# Patient Record
Sex: FEMALE | Race: WHITE | NOT HISPANIC OR LATINO
[De-identification: names, ages, dates, MRNs, and addresses within clinical notes are randomized per-mention and may not be internally consistent; named-entity substitution may affect disease eponyms.]

---

## 2022-07-12 ENCOUNTER — TRANSCRIPTION ENCOUNTER (OUTPATIENT)
Age: 87
End: 2022-07-12

## 2022-07-12 ENCOUNTER — INPATIENT (INPATIENT)
Facility: HOSPITAL | Age: 87
LOS: 5 days | Discharge: HOME | DRG: 660 | End: 2022-07-18
Attending: FAMILY MEDICINE | Admitting: UROLOGY
Payer: MEDICARE

## 2022-07-12 VITALS
HEART RATE: 82 BPM | DIASTOLIC BLOOD PRESSURE: 84 MMHG | HEIGHT: 60 IN | OXYGEN SATURATION: 99 % | SYSTOLIC BLOOD PRESSURE: 207 MMHG | WEIGHT: 136.03 LBS | TEMPERATURE: 98 F | RESPIRATION RATE: 18 BRPM

## 2022-07-12 LAB
ALBUMIN SERPL ELPH-MCNC: 4.3 G/DL — SIGNIFICANT CHANGE UP (ref 3.3–5.2)
ALP SERPL-CCNC: 80 U/L — SIGNIFICANT CHANGE UP (ref 40–120)
ALT FLD-CCNC: 13 U/L — SIGNIFICANT CHANGE UP
ANION GAP SERPL CALC-SCNC: 13 MMOL/L — SIGNIFICANT CHANGE UP (ref 5–17)
APPEARANCE UR: ABNORMAL
AST SERPL-CCNC: 18 U/L — SIGNIFICANT CHANGE UP
BASOPHILS # BLD AUTO: 0.01 K/UL — SIGNIFICANT CHANGE UP (ref 0–0.2)
BASOPHILS NFR BLD AUTO: 0.1 % — SIGNIFICANT CHANGE UP (ref 0–2)
BILIRUB SERPL-MCNC: 0.8 MG/DL — SIGNIFICANT CHANGE UP (ref 0.4–2)
BILIRUB UR-MCNC: ABNORMAL
BUN SERPL-MCNC: 19.3 MG/DL — SIGNIFICANT CHANGE UP (ref 8–20)
CALCIUM SERPL-MCNC: 9 MG/DL — SIGNIFICANT CHANGE UP (ref 8.6–10.2)
CHLORIDE SERPL-SCNC: 97 MMOL/L — LOW (ref 98–107)
CO2 SERPL-SCNC: 25 MMOL/L — SIGNIFICANT CHANGE UP (ref 22–29)
COLOR SPEC: YELLOW — SIGNIFICANT CHANGE UP
CREAT SERPL-MCNC: 0.82 MG/DL — SIGNIFICANT CHANGE UP (ref 0.5–1.3)
DIFF PNL FLD: ABNORMAL
EGFR: 68 ML/MIN/1.73M2 — SIGNIFICANT CHANGE UP
EOSINOPHIL # BLD AUTO: 0.01 K/UL — SIGNIFICANT CHANGE UP (ref 0–0.5)
EOSINOPHIL NFR BLD AUTO: 0.1 % — SIGNIFICANT CHANGE UP (ref 0–6)
EPI CELLS # UR: SIGNIFICANT CHANGE UP
GLUCOSE SERPL-MCNC: 116 MG/DL — HIGH (ref 70–99)
GLUCOSE UR QL: NEGATIVE MG/DL — SIGNIFICANT CHANGE UP
HCT VFR BLD CALC: 40.7 % — SIGNIFICANT CHANGE UP (ref 34.5–45)
HGB BLD-MCNC: 13.9 G/DL — SIGNIFICANT CHANGE UP (ref 11.5–15.5)
IMM GRANULOCYTES NFR BLD AUTO: 0.4 % — SIGNIFICANT CHANGE UP (ref 0–1.5)
KETONES UR-MCNC: ABNORMAL
LEUKOCYTE ESTERASE UR-ACNC: ABNORMAL
LYMPHOCYTES # BLD AUTO: 0.86 K/UL — LOW (ref 1–3.3)
LYMPHOCYTES # BLD AUTO: 9.4 % — LOW (ref 13–44)
MCHC RBC-ENTMCNC: 31.9 PG — SIGNIFICANT CHANGE UP (ref 27–34)
MCHC RBC-ENTMCNC: 34.2 GM/DL — SIGNIFICANT CHANGE UP (ref 32–36)
MCV RBC AUTO: 93.3 FL — SIGNIFICANT CHANGE UP (ref 80–100)
MONOCYTES # BLD AUTO: 0.47 K/UL — SIGNIFICANT CHANGE UP (ref 0–0.9)
MONOCYTES NFR BLD AUTO: 5.1 % — SIGNIFICANT CHANGE UP (ref 2–14)
NEUTROPHILS # BLD AUTO: 7.75 K/UL — HIGH (ref 1.8–7.4)
NEUTROPHILS NFR BLD AUTO: 84.9 % — HIGH (ref 43–77)
NITRITE UR-MCNC: NEGATIVE — SIGNIFICANT CHANGE UP
PH UR: 5 — SIGNIFICANT CHANGE UP (ref 5–8)
PLATELET # BLD AUTO: 232 K/UL — SIGNIFICANT CHANGE UP (ref 150–400)
POTASSIUM SERPL-MCNC: 4 MMOL/L — SIGNIFICANT CHANGE UP (ref 3.5–5.3)
POTASSIUM SERPL-SCNC: 4 MMOL/L — SIGNIFICANT CHANGE UP (ref 3.5–5.3)
PROT SERPL-MCNC: 6.8 G/DL — SIGNIFICANT CHANGE UP (ref 6.6–8.7)
PROT UR-MCNC: 30 MG/DL
RBC # BLD: 4.36 M/UL — SIGNIFICANT CHANGE UP (ref 3.8–5.2)
RBC # FLD: 12.2 % — SIGNIFICANT CHANGE UP (ref 10.3–14.5)
RBC CASTS # UR COMP ASSIST: ABNORMAL /HPF (ref 0–4)
SODIUM SERPL-SCNC: 135 MMOL/L — SIGNIFICANT CHANGE UP (ref 135–145)
SP GR SPEC: 1.02 — SIGNIFICANT CHANGE UP (ref 1.01–1.02)
UROBILINOGEN FLD QL: 4 MG/DL
WBC # BLD: 9.14 K/UL — SIGNIFICANT CHANGE UP (ref 3.8–10.5)
WBC # FLD AUTO: 9.14 K/UL — SIGNIFICANT CHANGE UP (ref 3.8–10.5)
WBC UR QL: SIGNIFICANT CHANGE UP /HPF (ref 0–5)

## 2022-07-12 PROCEDURE — 99285 EMERGENCY DEPT VISIT HI MDM: CPT

## 2022-07-12 RX ORDER — LIDOCAINE 4 G/100G
1 CREAM TOPICAL ONCE
Refills: 0 | Status: COMPLETED | OUTPATIENT
Start: 2022-07-12 | End: 2022-07-12

## 2022-07-12 RX ORDER — CEFTRIAXONE 500 MG/1
1000 INJECTION, POWDER, FOR SOLUTION INTRAMUSCULAR; INTRAVENOUS ONCE
Refills: 0 | Status: COMPLETED | OUTPATIENT
Start: 2022-07-12 | End: 2022-07-12

## 2022-07-12 RX ORDER — METHOCARBAMOL 500 MG/1
1500 TABLET, FILM COATED ORAL ONCE
Refills: 0 | Status: COMPLETED | OUTPATIENT
Start: 2022-07-12 | End: 2022-07-12

## 2022-07-12 RX ORDER — KETOROLAC TROMETHAMINE 30 MG/ML
15 SYRINGE (ML) INJECTION ONCE
Refills: 0 | Status: DISCONTINUED | OUTPATIENT
Start: 2022-07-12 | End: 2022-07-12

## 2022-07-12 RX ADMIN — LIDOCAINE 1 PATCH: 4 CREAM TOPICAL at 21:27

## 2022-07-12 RX ADMIN — CEFTRIAXONE 100 MILLIGRAM(S): 500 INJECTION, POWDER, FOR SOLUTION INTRAMUSCULAR; INTRAVENOUS at 23:03

## 2022-07-12 RX ADMIN — METHOCARBAMOL 1500 MILLIGRAM(S): 500 TABLET, FILM COATED ORAL at 21:26

## 2022-07-12 RX ADMIN — Medication 15 MILLIGRAM(S): at 21:27

## 2022-07-12 NOTE — ED PROVIDER NOTE - ATTENDING CONTRIBUTION TO CARE
I personally saw the patient with the resident, and completed the key components of the history and physical exam. I then discussed the management plan with the resident.    88 y/o with PMH chronic compression fractures, HTN presents for back pain, urinary frequency, urgency, minimal output and an episode of vomiting x 3 days that feels different from her usual back pain. Denies bladder/bowel dysfunction, numbness, weakness.    PE - NAD, well appearing, abd soft, + suprapubic TTP, no midline step offs or point tenderness of the thoracic and lumbar spine, no CVAT, full ROM bilateral LE, sensation intact, ambulates steadily with assistance.    labs, UA, CT, pain control, reassess.

## 2022-07-12 NOTE — ED PROVIDER NOTE - CLINICAL SUMMARY MEDICAL DECISION MAKING FREE TEXT BOX
Patient is a 88 yo female with PMHx osteoporosis, chronic compression fractures, HTN presenting with back pain, urinary urgency, hesitancy, and n/v. As per patient and daughter at bedside patient developed 8/10 dull constant back pain 3 days ago without relief from Tylenol as well as urinary hesitancy, dribbling, and 1 episode of nonbilious nonbloody vomiting 2 days ago. BP 200s/80s, vitals otherwise stable, uncomfortable appearing, lungs ctab, belly soft nontender, no cva ttp, midline T&L spine ttp no stepoffs or obvious deformities, full ROM at the hips knees ankles, no FND, no ataxia, PERRLA, EOMI. Will obtain CT A/P and T&L spine to r/o pyelo vs. spinal fx, obtain labs UA Ucx to r/o UTI, and control back pain with toradol lidocaine robaxin.

## 2022-07-12 NOTE — ED PROVIDER NOTE - PHYSICAL EXAMINATION
Constitutional: Uncomfortable appearing, awake, alert, oriented to person, place, and time/situation and in no apparent distress  ENMT: Airway patent nasal mucosa clear. Mouth with normal mucosa. Throat has no vesicles no oropharyngeal exudates and uvula is midline. No blood in the oropharynx  EYES: clear bilaterally, pupils equal, round and reactive to light  Cardiac: Regular rate, regular rhythm. Heart sounds S1, S2. No murmurs, rubs or gallops. Good capillary refill, 2+ pulses, no peripheral edema  Respiratory: Lungs CTAB, no use of accessory muscles, no crackles, satting 99% on RA in no distress  Gastrointestinal: Abdomen nondistended, non-tender, no rebound guarding or peritoneal signs  Genitourinary: No CVA tenderness, mild suprapubic ttp, bladder nondistended  Musculoskeletal: Spine appears normal, range of motion is not limited, no cervical ttp, midline Thoracic and upper lumbar ttp no stepoffs or obvious deformities, negative straight leg test, full ROM at the hips knees ankles, sensation and reflexes intact, 2+ pulses, capillary refill < 2 seconds  Neurological: Alert and oriented, no focal deficits, no motor or sensory deficits. CN 2-12 intact, PERRLA, EOMI, No FND, moving all extremities equally, sensation intact, No dysmetria, no ataxia, negative heel-shin, 5/5 strength   Skin: Skin normal color for race, warm, dry and intact, No evidence of rash

## 2022-07-12 NOTE — ED ADULT TRIAGE NOTE - DIRECT TO ROOM CARE INITIATED:
Denies known Latex allergy or symptoms of Latex sensitivity.  Medications verified, no changes.  Pt here for right shoulder MRI results. Still having shoulder discomfort.   12-Jul-2022 20:10

## 2022-07-12 NOTE — ED PROVIDER NOTE - ATTENDING WITH...
Comment: Pt found no improvement with TAC 0.1% and had adverse effects of steroids including thinning skin/light spots; opts only for non steroid topicals Detail Level: Simple Render Risk Assessment In Note?: no Resident

## 2022-07-12 NOTE — ED ADULT TRIAGE NOTE - CHIEF COMPLAINT QUOTE
pt c/o nontraumatic lumbar pain x3 days. pt tried taking Tylenol and Motrin @ home w/ no relief. pt has HTN, did not take meds today.

## 2022-07-12 NOTE — ED PROVIDER NOTE - OBJECTIVE STATEMENT
Patient is a 88 yo female with PMHx osteoporosis, chronic compression fractures, HTN presenting with back pain, urinary urgency, hesitancy, and n/v. As per patient and daughter at bedside patient developed 8/10 dull constant back pain 3 days ago without relief from Tylenol as well as urinary hesitancy, dribbling, and 1 episode of nonbilious nonbloody vomiting 2 days ago. Denies falls, headstrike, LOC, blood thinners, hematuria, fevers, CP, SOB, abdominal pain, IVDU, saddle anesthesia, /GI procedures, difficulty urinating, urinary or bowel incontinence. Patient received prednisone from urgent care with mild relief; she did not take her home Lisinopril 30 mg today. Denies fevers, chills, dizziness, lightheadedness, dysphagia, dysarthria, diplopia, photophobia, syncope, cough, congestion, SOB, CP, abdominal pain, neck pain, diarrhea, dysuria, hematuria, hematochezia, hematemesis,  recent travel, sick contacts, leg swelling.

## 2022-07-12 NOTE — ED PROVIDER NOTE - SHIFT CHANGE DETAILS
Patient signed out pending labs, CT and reassessment, all further work up and management at the discretion of receiving physician.

## 2022-07-13 DIAGNOSIS — N20.0 CALCULUS OF KIDNEY: ICD-10-CM

## 2022-07-13 LAB
ABO RH CONFIRMATION: SIGNIFICANT CHANGE UP
APTT BLD: 25.4 SEC — LOW (ref 27.5–35.5)
BASOPHILS # BLD AUTO: 0.01 K/UL — SIGNIFICANT CHANGE UP (ref 0–0.2)
BASOPHILS NFR BLD AUTO: 0.1 % — SIGNIFICANT CHANGE UP (ref 0–2)
BLD GP AB SCN SERPL QL: SIGNIFICANT CHANGE UP
EOSINOPHIL # BLD AUTO: 0 K/UL — SIGNIFICANT CHANGE UP (ref 0–0.5)
EOSINOPHIL NFR BLD AUTO: 0 % — SIGNIFICANT CHANGE UP (ref 0–6)
HCT VFR BLD CALC: 39.8 % — SIGNIFICANT CHANGE UP (ref 34.5–45)
HGB BLD-MCNC: 13.7 G/DL — SIGNIFICANT CHANGE UP (ref 11.5–15.5)
IMM GRANULOCYTES NFR BLD AUTO: 0.5 % — SIGNIFICANT CHANGE UP (ref 0–1.5)
INR BLD: 1.01 RATIO — SIGNIFICANT CHANGE UP (ref 0.88–1.16)
LYMPHOCYTES # BLD AUTO: 1.28 K/UL — SIGNIFICANT CHANGE UP (ref 1–3.3)
LYMPHOCYTES # BLD AUTO: 15.3 % — SIGNIFICANT CHANGE UP (ref 13–44)
MAGNESIUM SERPL-MCNC: 1.6 MG/DL — SIGNIFICANT CHANGE UP (ref 1.6–2.6)
MCHC RBC-ENTMCNC: 31.9 PG — SIGNIFICANT CHANGE UP (ref 27–34)
MCHC RBC-ENTMCNC: 34.4 GM/DL — SIGNIFICANT CHANGE UP (ref 32–36)
MCV RBC AUTO: 92.8 FL — SIGNIFICANT CHANGE UP (ref 80–100)
MONOCYTES # BLD AUTO: 0.56 K/UL — SIGNIFICANT CHANGE UP (ref 0–0.9)
MONOCYTES NFR BLD AUTO: 6.7 % — SIGNIFICANT CHANGE UP (ref 2–14)
NEUTROPHILS # BLD AUTO: 6.48 K/UL — SIGNIFICANT CHANGE UP (ref 1.8–7.4)
NEUTROPHILS NFR BLD AUTO: 77.4 % — HIGH (ref 43–77)
PHOSPHATE SERPL-MCNC: 3.6 MG/DL — SIGNIFICANT CHANGE UP (ref 2.4–4.7)
PLATELET # BLD AUTO: 220 K/UL — SIGNIFICANT CHANGE UP (ref 150–400)
PROTHROM AB SERPL-ACNC: 11.7 SEC — SIGNIFICANT CHANGE UP (ref 10.5–13.4)
RBC # BLD: 4.29 M/UL — SIGNIFICANT CHANGE UP (ref 3.8–5.2)
RBC # FLD: 12.2 % — SIGNIFICANT CHANGE UP (ref 10.3–14.5)
SARS-COV-2 RNA SPEC QL NAA+PROBE: SIGNIFICANT CHANGE UP
WBC # BLD: 8.37 K/UL — SIGNIFICANT CHANGE UP (ref 3.8–10.5)
WBC # FLD AUTO: 8.37 K/UL — SIGNIFICANT CHANGE UP (ref 3.8–10.5)

## 2022-07-13 PROCEDURE — 99222 1ST HOSP IP/OBS MODERATE 55: CPT | Mod: 25

## 2022-07-13 PROCEDURE — 72128 CT CHEST SPINE W/O DYE: CPT | Mod: 26,MA

## 2022-07-13 PROCEDURE — 72131 CT LUMBAR SPINE W/O DYE: CPT | Mod: 26,MA

## 2022-07-13 PROCEDURE — 52332 CYSTOSCOPY AND TREATMENT: CPT | Mod: LT

## 2022-07-13 PROCEDURE — 74177 CT ABD & PELVIS W/CONTRAST: CPT | Mod: 26,MA

## 2022-07-13 PROCEDURE — 99232 SBSQ HOSP IP/OBS MODERATE 35: CPT

## 2022-07-13 DEVICE — STENT URET DBL J 6X24 W/O GDWIRE: Type: IMPLANTABLE DEVICE | Status: FUNCTIONAL

## 2022-07-13 DEVICE — CATH URET AXCESS 6FR 70CM: Type: IMPLANTABLE DEVICE | Status: FUNCTIONAL

## 2022-07-13 DEVICE — CATH URET STONE DISPLC DL 10FR: Type: IMPLANTABLE DEVICE | Status: FUNCTIONAL

## 2022-07-13 DEVICE — GWIRE NITINOL STRT TIP .035IN 150CM: Type: IMPLANTABLE DEVICE | Status: FUNCTIONAL

## 2022-07-13 RX ORDER — HYDRALAZINE HCL 50 MG
25 TABLET ORAL EVERY 6 HOURS
Refills: 0 | Status: DISCONTINUED | OUTPATIENT
Start: 2022-07-13 | End: 2022-07-18

## 2022-07-13 RX ORDER — TAMSULOSIN HYDROCHLORIDE 0.4 MG/1
0.4 CAPSULE ORAL AT BEDTIME
Refills: 0 | Status: DISCONTINUED | OUTPATIENT
Start: 2022-07-13 | End: 2022-07-13

## 2022-07-13 RX ORDER — AMLODIPINE BESYLATE 2.5 MG/1
5 TABLET ORAL DAILY
Refills: 0 | Status: DISCONTINUED | OUTPATIENT
Start: 2022-07-13 | End: 2022-07-18

## 2022-07-13 RX ORDER — ESCITALOPRAM OXALATE 10 MG/1
20 TABLET, FILM COATED ORAL DAILY
Refills: 0 | Status: DISCONTINUED | OUTPATIENT
Start: 2022-07-13 | End: 2022-07-13

## 2022-07-13 RX ORDER — SODIUM CHLORIDE 9 MG/ML
1000 INJECTION, SOLUTION INTRAVENOUS
Refills: 0 | Status: DISCONTINUED | OUTPATIENT
Start: 2022-07-13 | End: 2022-07-13

## 2022-07-13 RX ORDER — HYDROMORPHONE HYDROCHLORIDE 2 MG/ML
0.5 INJECTION INTRAMUSCULAR; INTRAVENOUS; SUBCUTANEOUS ONCE
Refills: 0 | Status: DISCONTINUED | OUTPATIENT
Start: 2022-07-13 | End: 2022-07-13

## 2022-07-13 RX ORDER — ONDANSETRON 8 MG/1
4 TABLET, FILM COATED ORAL ONCE
Refills: 0 | Status: DISCONTINUED | OUTPATIENT
Start: 2022-07-13 | End: 2022-07-13

## 2022-07-13 RX ORDER — LISINOPRIL 2.5 MG/1
20 TABLET ORAL DAILY
Refills: 0 | Status: DISCONTINUED | OUTPATIENT
Start: 2022-07-13 | End: 2022-07-13

## 2022-07-13 RX ORDER — CEPHALEXIN 500 MG
250 CAPSULE ORAL EVERY 6 HOURS
Refills: 0 | Status: DISCONTINUED | OUTPATIENT
Start: 2022-07-14 | End: 2022-07-18

## 2022-07-13 RX ORDER — ACETAMINOPHEN 500 MG
1000 TABLET ORAL ONCE
Refills: 0 | Status: COMPLETED | OUTPATIENT
Start: 2022-07-13 | End: 2022-07-13

## 2022-07-13 RX ORDER — KETOROLAC TROMETHAMINE 30 MG/ML
15 SYRINGE (ML) INJECTION EVERY 6 HOURS
Refills: 0 | Status: DISCONTINUED | OUTPATIENT
Start: 2022-07-13 | End: 2022-07-13

## 2022-07-13 RX ORDER — DEXTROSE MONOHYDRATE, SODIUM CHLORIDE, AND POTASSIUM CHLORIDE 50; .745; 4.5 G/1000ML; G/1000ML; G/1000ML
1000 INJECTION, SOLUTION INTRAVENOUS
Refills: 0 | Status: DISCONTINUED | OUTPATIENT
Start: 2022-07-13 | End: 2022-07-13

## 2022-07-13 RX ORDER — LISINOPRIL 2.5 MG/1
30 TABLET ORAL DAILY
Refills: 0 | Status: DISCONTINUED | OUTPATIENT
Start: 2022-07-13 | End: 2022-07-14

## 2022-07-13 RX ORDER — FENTANYL CITRATE 50 UG/ML
25 INJECTION INTRAVENOUS
Refills: 0 | Status: DISCONTINUED | OUTPATIENT
Start: 2022-07-13 | End: 2022-07-13

## 2022-07-13 RX ORDER — TRAMADOL HYDROCHLORIDE 50 MG/1
50 TABLET ORAL EVERY 6 HOURS
Refills: 0 | Status: DISCONTINUED | OUTPATIENT
Start: 2022-07-13 | End: 2022-07-18

## 2022-07-13 RX ORDER — TRAMADOL HYDROCHLORIDE 50 MG/1
25 TABLET ORAL EVERY 6 HOURS
Refills: 0 | Status: DISCONTINUED | OUTPATIENT
Start: 2022-07-13 | End: 2022-07-18

## 2022-07-13 RX ORDER — PANTOPRAZOLE SODIUM 20 MG/1
40 TABLET, DELAYED RELEASE ORAL
Refills: 0 | Status: DISCONTINUED | OUTPATIENT
Start: 2022-07-13 | End: 2022-07-13

## 2022-07-13 RX ORDER — KETOROLAC TROMETHAMINE 30 MG/ML
15 SYRINGE (ML) INJECTION ONCE
Refills: 0 | Status: DISCONTINUED | OUTPATIENT
Start: 2022-07-13 | End: 2022-07-13

## 2022-07-13 RX ORDER — HEPARIN SODIUM 5000 [USP'U]/ML
5000 INJECTION INTRAVENOUS; SUBCUTANEOUS EVERY 12 HOURS
Refills: 0 | Status: DISCONTINUED | OUTPATIENT
Start: 2022-07-13 | End: 2022-07-18

## 2022-07-13 RX ADMIN — HYDROMORPHONE HYDROCHLORIDE 0.5 MILLIGRAM(S): 2 INJECTION INTRAMUSCULAR; INTRAVENOUS; SUBCUTANEOUS at 17:17

## 2022-07-13 RX ADMIN — Medication 15 MILLIGRAM(S): at 20:37

## 2022-07-13 RX ADMIN — TAMSULOSIN HYDROCHLORIDE 0.4 MILLIGRAM(S): 0.4 CAPSULE ORAL at 04:42

## 2022-07-13 RX ADMIN — Medication 15 MILLIGRAM(S): at 20:43

## 2022-07-13 RX ADMIN — Medication 25 MILLIGRAM(S): at 20:42

## 2022-07-13 RX ADMIN — HEPARIN SODIUM 5000 UNIT(S): 5000 INJECTION INTRAVENOUS; SUBCUTANEOUS at 22:25

## 2022-07-13 RX ADMIN — Medication 400 MILLIGRAM(S): at 20:36

## 2022-07-13 RX ADMIN — HYDROMORPHONE HYDROCHLORIDE 0.5 MILLIGRAM(S): 2 INJECTION INTRAMUSCULAR; INTRAVENOUS; SUBCUTANEOUS at 08:59

## 2022-07-13 RX ADMIN — FENTANYL CITRATE 25 MICROGRAM(S): 50 INJECTION INTRAVENOUS at 19:49

## 2022-07-13 RX ADMIN — SODIUM CHLORIDE 75 MILLILITER(S): 9 INJECTION, SOLUTION INTRAVENOUS at 21:35

## 2022-07-13 RX ADMIN — SODIUM CHLORIDE 50 MILLILITER(S): 9 INJECTION, SOLUTION INTRAVENOUS at 16:52

## 2022-07-13 RX ADMIN — HYDROMORPHONE HYDROCHLORIDE 0.5 MILLIGRAM(S): 2 INJECTION INTRAMUSCULAR; INTRAVENOUS; SUBCUTANEOUS at 16:52

## 2022-07-13 RX ADMIN — HYDROMORPHONE HYDROCHLORIDE 0.5 MILLIGRAM(S): 2 INJECTION INTRAMUSCULAR; INTRAVENOUS; SUBCUTANEOUS at 09:03

## 2022-07-13 RX ADMIN — SODIUM CHLORIDE 50 MILLILITER(S): 9 INJECTION, SOLUTION INTRAVENOUS at 22:23

## 2022-07-13 RX ADMIN — HYDROMORPHONE HYDROCHLORIDE 0.5 MILLIGRAM(S): 2 INJECTION INTRAMUSCULAR; INTRAVENOUS; SUBCUTANEOUS at 12:06

## 2022-07-13 RX ADMIN — FENTANYL CITRATE 25 MICROGRAM(S): 50 INJECTION INTRAVENOUS at 20:08

## 2022-07-13 RX ADMIN — Medication 15 MILLIGRAM(S): at 22:40

## 2022-07-13 RX ADMIN — SODIUM CHLORIDE 50 MILLILITER(S): 9 INJECTION, SOLUTION INTRAVENOUS at 12:08

## 2022-07-13 RX ADMIN — ESCITALOPRAM OXALATE 20 MILLIGRAM(S): 10 TABLET, FILM COATED ORAL at 05:36

## 2022-07-13 RX ADMIN — Medication 15 MILLIGRAM(S): at 22:55

## 2022-07-13 RX ADMIN — LISINOPRIL 20 MILLIGRAM(S): 2.5 TABLET ORAL at 05:36

## 2022-07-13 RX ADMIN — Medication 400 MILLIGRAM(S): at 22:24

## 2022-07-13 RX ADMIN — Medication 1000 MILLIGRAM(S): at 20:43

## 2022-07-13 RX ADMIN — Medication 1000 MILLIGRAM(S): at 22:54

## 2022-07-13 RX ADMIN — SODIUM CHLORIDE 75 MILLILITER(S): 9 INJECTION, SOLUTION INTRAVENOUS at 19:53

## 2022-07-13 RX ADMIN — FENTANYL CITRATE 25 MICROGRAM(S): 50 INJECTION INTRAVENOUS at 19:53

## 2022-07-13 RX ADMIN — AMLODIPINE BESYLATE 5 MILLIGRAM(S): 2.5 TABLET ORAL at 20:41

## 2022-07-13 NOTE — PROGRESS NOTE ADULT - SUBJECTIVE AND OBJECTIVE BOX
CC: Left obstructing ureteral stone s/p cystoscopy and stent  HPI: 88 y/o female with hx of of HTN, chronic back pain from lumbar compression fx, admitted to Urology service on 7/12 for obstructing left sided proximal UPJ stone. Patient underwent cystoscopy and stent placement today. Post operatively patient with persistent HTN despite adequate pain control and Urology requesting transfer to Medicine service for BP management prior to being DCd home. No reported intra-op complications reported. Patient does admit to having elevated BP at baseline. Currently only on lisinopril 30mg daily. SBP ranging from 170-200. See in Pacu  CC: Left obstructing ureteral stone s/p cystoscopy and stent  HPI: 89 y/o female with hx of of HTN, chronic back pain from lumbar compression fx, admitted to Urology service on 7/12 for obstructing left sided proximal UPJ stone. Patient underwent cystoscopy and stent placement today. Post operatively patient with persistent HTN despite adequate pain control and Urology requesting transfer to Medicine service for BP management prior to being DCd home. No reported intra-op complications reported. Patient does admit to having elevated BP at baseline. Currently only on lisinopril 30mg daily. SBP ranging from 170-200. See in Pacu with complaints of typical back pain. Denies LE weakness, denies fever, chills, N/V, SOB, CP. Discussed plan with urology who wants patient to remain on Keflex post procedure. Daughter aware patient will be admitted for BP management.

## 2022-07-13 NOTE — PROGRESS NOTE ADULT - ASSESSMENT
88 y/o female with left obstructing renal stone s/p cystoscopy and stent placement, Uncontrolled HTN, Chronic back pain from lumbar compression fractures     Renal stone s/p stent:  -Cont. care per Urology  -Cont. Abx  -Pain control  -OOB, ambulate PT program  -DVT-P with sub q heparin  -Arrange for o/p Urology f/u on DC  -Fall risk, ambulate with assistance    Uncontrolled HTN:  -Stage 2 HTN  -Add Norvasc, cont. Lisinopril   -DASH diet  -PRN Hydralazine    lumbar compression fx:  -CTs on admission reviewed   -Fall risk  -Tyelnol, toradol  -Add percocet prn   -PT program  -o/p f/u    Discussed with Urology NP, PACU Nurse, Patient

## 2022-07-13 NOTE — PROGRESS NOTE ADULT - COMMENTS
Vital Signs Last 24 Hrs  T(C): 36.4 (13 Jul 2022 22:00), Max: 36.9 (13 Jul 2022 16:38)  T(F): 97.6 (13 Jul 2022 22:00), Max: 98.5 (13 Jul 2022 16:38)  HR: 82 (13 Jul 2022 23:05) (62 - 100)  BP: 177/70 (13 Jul 2022 23:05) (133/71 - 207/93)  BP(mean): 101 (13 Jul 2022 21:16) (87 - 113)  RR: 18 (13 Jul 2022 23:05) (10 - 20)  SpO2: 98% (13 Jul 2022 23:05) (90% - 99%)    Parameters below as of 13 Jul 2022 23:05  Patient On (Oxygen Delivery Method): nasal cannula  O2 Flow (L/min): 2

## 2022-07-13 NOTE — PATIENT PROFILE ADULT - FALL HARM RISK - HARM RISK INTERVENTIONS

## 2022-07-13 NOTE — H&P ADULT - NSHPADDITIONALINFOADULT_GEN_ALL_CORE
Patient has risk of developing sepsis and the stone is not likely to pass given its size.  no fevers or chills.    Feel that stent placement would make ureteroscopy easier in addition to alleviating her discomfort.     Added on to OR schedule.

## 2022-07-13 NOTE — ED ADULT NURSE REASSESSMENT NOTE - NS ED NURSE REASSESS COMMENT FT1
pt received in shift change report at this time, resting in cart comfortable. pt is in no distress, c.o intermittent dull pain. aware of NPO status and made aware of plan for OR today. pt with no complaints, BP elevated but hx HTN and becoming more stable meds given by previous shift. safety maintained, will continue to monitor.

## 2022-07-13 NOTE — BRIEF OPERATIVE NOTE - NSICDXBRIEFPOSTOP_GEN_ALL_CORE_FT
POST-OP DIAGNOSIS:  Renal colic 13-Jul-2022 19:10:37  Fran Alanis  Ureteral stone 13-Jul-2022 19:10:42  Fran Alanis

## 2022-07-13 NOTE — H&P ADULT - HISTORY OF PRESENT ILLNESS
Patient is a 88 y/o F with chronic compression fractures to thoracic and lumbar spine presenting with 3 days of left flank pain with no associated symptoms. Patient states " this has been bothering me for awhile but the last 3 days it got worse" Patient found to have +UTI Patient is a 89 y/o F with chronic compression fractures to thoracic and lumbar spine presenting with 3 days of left flank pain with no associated symptoms. Patient states " this has been bothering me for awhile but the last 3 days it got worse" nothing makes the pain better or worse.  Patient denies N/V, anorexia, malaise. Patient found to have +UTI, and ct of the ab/pelvis shows left mild hydronephrosis with 9/13mm stone in the proximal ureter. Patient states that she has never had kidney stones in the past.

## 2022-07-13 NOTE — CHART NOTE - NSCHARTNOTEFT_GEN_A_CORE
Patient post-op from cysto with left stent with Dr. Fran Alanis has been hypertensive, to note does have a history of HTN and was hypertensive post-op. I spoke with hospitalist Ken Alanis who has agreed to transfer the patient to medicine for blood pressure management.

## 2022-07-13 NOTE — ED ADULT NURSE REASSESSMENT NOTE - NS ED NURSE REASSESS COMMENT FT1
Pt alert and oriented X4, pt c/o pain but fall asleep, safety in place, will endorse to oncoming shift.

## 2022-07-13 NOTE — BRIEF OPERATIVE NOTE - NSICDXBRIEFPREOP_GEN_ALL_CORE_FT
PRE-OP DIAGNOSIS:  Renal colic 13-Jul-2022 19:09:55  Fran Alanis  Ureteral stone 13-Jul-2022 19:10:30  Fran Alanis

## 2022-07-13 NOTE — H&P ADULT - NSHPPHYSICALEXAM_GEN_ALL_CORE
Constitutional: Well-developed well nourished Female in no acute distress, appears younger than stated age  Respiratory: Breath sounds CTA b/l respirations are unlabored, no accessory muscle use, no conversational dyspnea  Cardiovascular: Regular rate & rhythm, +S1, S1,  Gastrointestinal: Abdomen soft, non-tender, non-distended, no rebound tenderness / guarding,   : voiding spontaneously, mild ttp to left flank  Neurological: GCS: 15 (4/5/6). A&O x 3; no gross sensory / motor / coordination deficits

## 2022-07-13 NOTE — ASU PREOP CHECKLIST - BMI (KG/M2)
Pt walked up from . Dual skin assessment completed with RN, no findings aside from surgical wounds. See epic for details. Chest tube intact, dressing CDI, crepitus noted around area, flow 1900 (consistent with report from CV RN), draininage sanguinous. VSS. Pain medications administered.     Bed alarm on, call light within reach.    26.4

## 2022-07-13 NOTE — H&P ADULT - ASSESSMENT
Patient is a 89F with pmh HTN, HL, depression presenting with 3 day history of left flank pain with no associated symptoms, and + UTI  Plan:  admit to [facility]   NPO except for meds  IV hydration   f/u am labs  f/u urine culture  pain control   continue home meds  To OR today with cysto with left stent

## 2022-07-14 DIAGNOSIS — N20.0 CALCULUS OF KIDNEY: ICD-10-CM

## 2022-07-14 DIAGNOSIS — S32.000A WEDGE COMPRESSION FRACTURE OF UNSPECIFIED LUMBAR VERTEBRA, INITIAL ENCOUNTER FOR CLOSED FRACTURE: ICD-10-CM

## 2022-07-14 DIAGNOSIS — Z02.9 ENCOUNTER FOR ADMINISTRATIVE EXAMINATIONS, UNSPECIFIED: ICD-10-CM

## 2022-07-14 DIAGNOSIS — K59.00 CONSTIPATION, UNSPECIFIED: ICD-10-CM

## 2022-07-14 DIAGNOSIS — I10 ESSENTIAL (PRIMARY) HYPERTENSION: ICD-10-CM

## 2022-07-14 DIAGNOSIS — Z29.9 ENCOUNTER FOR PROPHYLACTIC MEASURES, UNSPECIFIED: ICD-10-CM

## 2022-07-14 LAB
ANION GAP SERPL CALC-SCNC: 13 MMOL/L — SIGNIFICANT CHANGE UP (ref 5–17)
BUN SERPL-MCNC: 14.3 MG/DL — SIGNIFICANT CHANGE UP (ref 8–20)
CALCIUM SERPL-MCNC: 8.6 MG/DL — SIGNIFICANT CHANGE UP (ref 8.6–10.2)
CHLORIDE SERPL-SCNC: 95 MMOL/L — LOW (ref 98–107)
CO2 SERPL-SCNC: 27 MMOL/L — SIGNIFICANT CHANGE UP (ref 22–29)
CREAT SERPL-MCNC: 0.52 MG/DL — SIGNIFICANT CHANGE UP (ref 0.5–1.3)
CULTURE RESULTS: SIGNIFICANT CHANGE UP
EGFR: 89 ML/MIN/1.73M2 — SIGNIFICANT CHANGE UP
GLUCOSE SERPL-MCNC: 103 MG/DL — HIGH (ref 70–99)
HCT VFR BLD CALC: 37.5 % — SIGNIFICANT CHANGE UP (ref 34.5–45)
HGB BLD-MCNC: 13 G/DL — SIGNIFICANT CHANGE UP (ref 11.5–15.5)
MAGNESIUM SERPL-MCNC: 1.5 MG/DL — LOW (ref 1.8–2.6)
MCHC RBC-ENTMCNC: 32.6 PG — SIGNIFICANT CHANGE UP (ref 27–34)
MCHC RBC-ENTMCNC: 34.7 GM/DL — SIGNIFICANT CHANGE UP (ref 32–36)
MCV RBC AUTO: 94 FL — SIGNIFICANT CHANGE UP (ref 80–100)
PHOSPHATE SERPL-MCNC: 2.8 MG/DL — SIGNIFICANT CHANGE UP (ref 2.4–4.7)
PLATELET # BLD AUTO: 218 K/UL — SIGNIFICANT CHANGE UP (ref 150–400)
POTASSIUM SERPL-MCNC: 4.6 MMOL/L — SIGNIFICANT CHANGE UP (ref 3.5–5.3)
POTASSIUM SERPL-SCNC: 4.6 MMOL/L — SIGNIFICANT CHANGE UP (ref 3.5–5.3)
RBC # BLD: 3.99 M/UL — SIGNIFICANT CHANGE UP (ref 3.8–5.2)
RBC # FLD: 12.2 % — SIGNIFICANT CHANGE UP (ref 10.3–14.5)
SODIUM SERPL-SCNC: 134 MMOL/L — LOW (ref 135–145)
SPECIMEN SOURCE: SIGNIFICANT CHANGE UP
WBC # BLD: 7.69 K/UL — SIGNIFICANT CHANGE UP (ref 3.8–10.5)
WBC # FLD AUTO: 7.69 K/UL — SIGNIFICANT CHANGE UP (ref 3.8–10.5)

## 2022-07-14 PROCEDURE — 99231 SBSQ HOSP IP/OBS SF/LOW 25: CPT

## 2022-07-14 PROCEDURE — 99232 SBSQ HOSP IP/OBS MODERATE 35: CPT

## 2022-07-14 RX ORDER — IBUPROFEN 200 MG
600 TABLET ORAL EVERY 6 HOURS
Refills: 0 | Status: DISCONTINUED | OUTPATIENT
Start: 2022-07-14 | End: 2022-07-15

## 2022-07-14 RX ORDER — MAGNESIUM SULFATE 500 MG/ML
2 VIAL (ML) INJECTION ONCE
Refills: 0 | Status: COMPLETED | OUTPATIENT
Start: 2022-07-14 | End: 2022-07-14

## 2022-07-14 RX ORDER — KETOROLAC TROMETHAMINE 30 MG/ML
15 SYRINGE (ML) INJECTION ONCE
Refills: 0 | Status: DISCONTINUED | OUTPATIENT
Start: 2022-07-14 | End: 2022-07-14

## 2022-07-14 RX ORDER — ACETAMINOPHEN 500 MG
1000 TABLET ORAL ONCE
Refills: 0 | Status: COMPLETED | OUTPATIENT
Start: 2022-07-14 | End: 2022-07-14

## 2022-07-14 RX ORDER — SENNA PLUS 8.6 MG/1
2 TABLET ORAL AT BEDTIME
Refills: 0 | Status: DISCONTINUED | OUTPATIENT
Start: 2022-07-14 | End: 2022-07-18

## 2022-07-14 RX ORDER — LISINOPRIL 2.5 MG/1
10 TABLET ORAL ONCE
Refills: 0 | Status: COMPLETED | OUTPATIENT
Start: 2022-07-14 | End: 2022-07-14

## 2022-07-14 RX ORDER — POLYETHYLENE GLYCOL 3350 17 G/17G
17 POWDER, FOR SOLUTION ORAL DAILY
Refills: 0 | Status: COMPLETED | OUTPATIENT
Start: 2022-07-14 | End: 2022-07-17

## 2022-07-14 RX ORDER — LISINOPRIL 2.5 MG/1
40 TABLET ORAL DAILY
Refills: 0 | Status: DISCONTINUED | OUTPATIENT
Start: 2022-07-15 | End: 2022-07-18

## 2022-07-14 RX ORDER — SODIUM CHLORIDE 9 MG/ML
1000 INJECTION, SOLUTION INTRAVENOUS
Refills: 0 | Status: DISCONTINUED | OUTPATIENT
Start: 2022-07-14 | End: 2022-07-15

## 2022-07-14 RX ADMIN — TRAMADOL HYDROCHLORIDE 50 MILLIGRAM(S): 50 TABLET ORAL at 03:40

## 2022-07-14 RX ADMIN — Medication 600 MILLIGRAM(S): at 12:55

## 2022-07-14 RX ADMIN — LISINOPRIL 10 MILLIGRAM(S): 2.5 TABLET ORAL at 15:20

## 2022-07-14 RX ADMIN — HEPARIN SODIUM 5000 UNIT(S): 5000 INJECTION INTRAVENOUS; SUBCUTANEOUS at 21:36

## 2022-07-14 RX ADMIN — Medication 25 MILLIGRAM(S): at 23:58

## 2022-07-14 RX ADMIN — SENNA PLUS 2 TABLET(S): 8.6 TABLET ORAL at 21:36

## 2022-07-14 RX ADMIN — POLYETHYLENE GLYCOL 3350 17 GRAM(S): 17 POWDER, FOR SOLUTION ORAL at 12:42

## 2022-07-14 RX ADMIN — TRAMADOL HYDROCHLORIDE 50 MILLIGRAM(S): 50 TABLET ORAL at 21:35

## 2022-07-14 RX ADMIN — Medication 1000 MILLIGRAM(S): at 06:07

## 2022-07-14 RX ADMIN — TRAMADOL HYDROCHLORIDE 50 MILLIGRAM(S): 50 TABLET ORAL at 02:49

## 2022-07-14 RX ADMIN — TRAMADOL HYDROCHLORIDE 50 MILLIGRAM(S): 50 TABLET ORAL at 10:21

## 2022-07-14 RX ADMIN — TRAMADOL HYDROCHLORIDE 50 MILLIGRAM(S): 50 TABLET ORAL at 22:30

## 2022-07-14 RX ADMIN — AMLODIPINE BESYLATE 5 MILLIGRAM(S): 2.5 TABLET ORAL at 05:03

## 2022-07-14 RX ADMIN — Medication 250 MILLIGRAM(S): at 06:15

## 2022-07-14 RX ADMIN — HEPARIN SODIUM 5000 UNIT(S): 5000 INJECTION INTRAVENOUS; SUBCUTANEOUS at 11:26

## 2022-07-14 RX ADMIN — Medication 600 MILLIGRAM(S): at 12:41

## 2022-07-14 RX ADMIN — TRAMADOL HYDROCHLORIDE 25 MILLIGRAM(S): 50 TABLET ORAL at 15:20

## 2022-07-14 RX ADMIN — SODIUM CHLORIDE 75 MILLILITER(S): 9 INJECTION, SOLUTION INTRAVENOUS at 20:03

## 2022-07-14 RX ADMIN — Medication 25 MILLIGRAM(S): at 12:10

## 2022-07-14 RX ADMIN — TRAMADOL HYDROCHLORIDE 50 MILLIGRAM(S): 50 TABLET ORAL at 11:20

## 2022-07-14 RX ADMIN — Medication 25 MILLIGRAM(S): at 17:23

## 2022-07-14 RX ADMIN — Medication 600 MILLIGRAM(S): at 21:00

## 2022-07-14 RX ADMIN — Medication 15 MILLIGRAM(S): at 05:37

## 2022-07-14 RX ADMIN — Medication 250 MILLIGRAM(S): at 11:26

## 2022-07-14 RX ADMIN — Medication 250 MILLIGRAM(S): at 20:06

## 2022-07-14 RX ADMIN — Medication 600 MILLIGRAM(S): at 20:03

## 2022-07-14 RX ADMIN — Medication 15 MILLIGRAM(S): at 05:52

## 2022-07-14 RX ADMIN — SODIUM CHLORIDE 75 MILLILITER(S): 9 INJECTION, SOLUTION INTRAVENOUS at 06:16

## 2022-07-14 RX ADMIN — LISINOPRIL 30 MILLIGRAM(S): 2.5 TABLET ORAL at 05:00

## 2022-07-14 RX ADMIN — Medication 400 MILLIGRAM(S): at 05:37

## 2022-07-14 RX ADMIN — Medication 25 GRAM(S): at 11:26

## 2022-07-14 NOTE — PROGRESS NOTE ADULT - SUBJECTIVE AND OBJECTIVE BOX
[facility] Division of Hospital Medicine    Chief Complaint:  Pelvic pain    SUBJECTIVE / OVERNIGHT EVENTS:  Patient seen and examined at bedside, states that she is still with pelvic pain radiating to her back , denies dysuria.  No BM for one week.  Patient denies chest pain, SOB, N/V, fever, chills, dysuria or any other complaints. All remainder ROS negative.     I&O's Summary    2022 07:01  -  2022 07:00  --------------------------------------------------------  IN: 500 mL / OUT: 820 mL / NET: -320 mL      Patient seen and examined at bedside.  PHYSICAL EXAM:  Vital Signs Last 24 Hrs  T(C): 36.6 (2022 10:10), Max: 36.9 (2022 16:38)  T(F): 97.8 (2022 10:10), Max: 98.5 (2022 16:38)  HR: 90 (2022 10:10) (62 - 91)  BP: 188/87 (2022 10:10) (133/71 - 205/72)  BP(mean): 101 (2022 21:16) (87 - 113)  RR: 18 (2022 10:10) (10 - 20)  SpO2: 97% (2022 10:10) (90% - 100%)    Parameters below as of 2022 10:10  Patient On (Oxygen Delivery Method): nasal cannula    Patient is calm but looks uncomfortable, alert and oriented, pleasant and corportative.  Patient is able speak fluently and effortless.   CONSTITUTIONAL: NAD, well-developed, well-groomed  ENMT: Moist oral mucosa, no pharyngeal injection or exudates; normal dentition  RESPIRATORY: Normal respiratory effort; lungs are clear to auscultation bilaterally  CARDIOVASCULAR: Regular rate and rhythm, normal S1 and S2, no murmur/rub/gallop; No lower extremity edema; Peripheral pulses are 2+ bilaterally  ABDOMEN: Nontender to palpation, normoactive bowel sounds, no rebound/guarding; No hepatosplenomegaly  MUSCLOSKELETAL:  Normal gait; no clubbing or cyanosis of digits; no joint swelling or tenderness to palpation  PSYCH: A+O to person, place, and time; affect appropriate  NEUROLOGY: CN 2-12 are intact and symmetric; no gross sensory deficits;   SKIN: No rashes; no palpable lesions    LABS:                        13.0   7.69  )-----------( 218      ( 2022 07:35 )             37.5     07-14    134<L>  |  95<L>  |  14.3  ----------------------------<  103<H>  4.6   |  27.0  |  0.52    Ca    8.6      2022 07:35  Phos  2.8     07-14  Mg     1.5     07-14    TPro  6.8  /  Alb  4.3  /  TBili  0.8  /  DBili  x   /  AST  18  /  ALT  13  /  AlkPhos  80  07-12    PT/INR - ( 2022 05:20 )   PT: 11.7 sec;   INR: 1.01 ratio    PTT - ( 2022 05:20 )  PTT:25.4 sec    Urinalysis Basic - ( 2022 22:12 )    Color: Yellow / Appearance: Slightly Turbid / S.025 / pH: x  Gluc: x / Ketone: Moderate  / Bili: Small / Urobili: 4 mg/dL   Blood: x / Protein: 30 mg/dL / Nitrite: Negative   Leuk Esterase: Moderate / RBC: 6-10 /HPF / WBC 3-5 /HPF   Sq Epi: x / Non Sq Epi: Few / Bacteria: x        Culture - Urine (collected 2022 02:25)  Source: Clean Catch Clean Catch (Midstream)  Final Report (2022 09:07):    <10,000 CFU/mL Normal Urogenital Mirian    MEDICATIONS  (STANDING):  amLODIPine   Tablet 5 milliGRAM(s) Oral daily  cephalexin 250 milliGRAM(s) Oral every 6 hours  heparin   Injectable 5000 Unit(s) SubCutaneous every 12 hours  multiple electrolytes Injection Type 1 1000 milliLiter(s) (75 mL/Hr) IV Continuous <Continuous>  polyethylene glycol 3350 17 Gram(s) Oral daily    MEDICATIONS  (PRN):  hydrALAZINE 25 milliGRAM(s) Oral every 6 hours PRN SBP >160  ibuprofen  Tablet. 600 milliGRAM(s) Oral every 6 hours PRN Temp greater or equal to 38C (100.4F), Mild Pain (1 - 3)  traMADol 25 milliGRAM(s) Oral every 6 hours PRN Moderate Pain (4 - 6)  traMADol 50 milliGRAM(s) Oral every 6 hours PRN Severe Pain (7 - 10)

## 2022-07-14 NOTE — PROGRESS NOTE ADULT - SUBJECTIVE AND OBJECTIVE BOX
UROLOGY NOTE:  POD# 1 s/p cystoscopy with left stent placement    Patient seen and examined this am  Awake, alert and responsive with post operative pain and discomfort most likely secondary to stent. Denies nausea or vomiting.  Denies headaches, dizziness just left back discomfort    Afebrile, VSS, /78, P 62, R 18    chest:  good air exchange, denies SOB or chest pain  Abdomen: soft, n/d, n/t  BacK:  some mild back tenderness  :  voiding well 850cc since OR    Labs: 7/14/22  Pending am results    Impression:  stable POD# 1, tolerated surgery , mild post operative pain and discomfort.  Duscuss with Surgeon present situation and continued care  Plan:  pain management and continue to follow - follow up am labs

## 2022-07-14 NOTE — PROGRESS NOTE ADULT - ASSESSMENT
89 y/o female with left obstructing renal stone s/p cystoscopy and stent placement, Uncontrolled HTN, Chronic back pain from lumbar compression fractures.  Is still with pain and elevated BP post procedure.

## 2022-07-15 ENCOUNTER — TRANSCRIPTION ENCOUNTER (OUTPATIENT)
Age: 87
End: 2022-07-15

## 2022-07-15 DIAGNOSIS — E87.1 HYPO-OSMOLALITY AND HYPONATREMIA: ICD-10-CM

## 2022-07-15 LAB
ANION GAP SERPL CALC-SCNC: 10 MMOL/L — SIGNIFICANT CHANGE UP (ref 5–17)
ANION GAP SERPL CALC-SCNC: 11 MMOL/L — SIGNIFICANT CHANGE UP (ref 5–17)
BUN SERPL-MCNC: 11.4 MG/DL — SIGNIFICANT CHANGE UP (ref 8–20)
BUN SERPL-MCNC: 13.5 MG/DL — SIGNIFICANT CHANGE UP (ref 8–20)
CALCIUM SERPL-MCNC: 8.4 MG/DL — LOW (ref 8.6–10.2)
CALCIUM SERPL-MCNC: 8.8 MG/DL — SIGNIFICANT CHANGE UP (ref 8.6–10.2)
CHLORIDE SERPL-SCNC: 81 MMOL/L — LOW (ref 98–107)
CHLORIDE SERPL-SCNC: 83 MMOL/L — LOW (ref 98–107)
CO2 SERPL-SCNC: 27 MMOL/L — SIGNIFICANT CHANGE UP (ref 22–29)
CO2 SERPL-SCNC: 27 MMOL/L — SIGNIFICANT CHANGE UP (ref 22–29)
CREAT SERPL-MCNC: 0.47 MG/DL — LOW (ref 0.5–1.3)
CREAT SERPL-MCNC: 0.64 MG/DL — SIGNIFICANT CHANGE UP (ref 0.5–1.3)
EGFR: 84 ML/MIN/1.73M2 — SIGNIFICANT CHANGE UP
EGFR: 91 ML/MIN/1.73M2 — SIGNIFICANT CHANGE UP
GLUCOSE SERPL-MCNC: 114 MG/DL — HIGH (ref 70–99)
GLUCOSE SERPL-MCNC: 123 MG/DL — HIGH (ref 70–99)
MAGNESIUM SERPL-MCNC: 1.9 MG/DL — SIGNIFICANT CHANGE UP (ref 1.6–2.6)
OSMOLALITY UR: 466 MOSM/KG — SIGNIFICANT CHANGE UP (ref 300–1000)
POTASSIUM SERPL-MCNC: 4.1 MMOL/L — SIGNIFICANT CHANGE UP (ref 3.5–5.3)
POTASSIUM SERPL-MCNC: 4.2 MMOL/L — SIGNIFICANT CHANGE UP (ref 3.5–5.3)
POTASSIUM SERPL-SCNC: 4.1 MMOL/L — SIGNIFICANT CHANGE UP (ref 3.5–5.3)
POTASSIUM SERPL-SCNC: 4.2 MMOL/L — SIGNIFICANT CHANGE UP (ref 3.5–5.3)
SODIUM SERPL-SCNC: 119 MMOL/L — CRITICAL LOW (ref 135–145)
SODIUM SERPL-SCNC: 120 MMOL/L — CRITICAL LOW (ref 135–145)
SODIUM UR-SCNC: 89 MMOL/L — SIGNIFICANT CHANGE UP

## 2022-07-15 PROCEDURE — 99232 SBSQ HOSP IP/OBS MODERATE 35: CPT

## 2022-07-15 RX ORDER — OXYCODONE AND ACETAMINOPHEN 5; 325 MG/1; MG/1
1 TABLET ORAL EVERY 4 HOURS
Refills: 0 | Status: DISCONTINUED | OUTPATIENT
Start: 2022-07-15 | End: 2022-07-18

## 2022-07-15 RX ORDER — MORPHINE SULFATE 50 MG/1
0.5 CAPSULE, EXTENDED RELEASE ORAL ONCE
Refills: 0 | Status: DISCONTINUED | OUTPATIENT
Start: 2022-07-15 | End: 2022-07-15

## 2022-07-15 RX ORDER — LACTULOSE 10 G/15ML
10 SOLUTION ORAL
Refills: 0 | Status: COMPLETED | OUTPATIENT
Start: 2022-07-15 | End: 2022-07-17

## 2022-07-15 RX ORDER — OXYCODONE AND ACETAMINOPHEN 5; 325 MG/1; MG/1
2 TABLET ORAL EVERY 4 HOURS
Refills: 0 | Status: DISCONTINUED | OUTPATIENT
Start: 2022-07-15 | End: 2022-07-18

## 2022-07-15 RX ORDER — ACETAMINOPHEN 500 MG
1000 TABLET ORAL ONCE
Refills: 0 | Status: COMPLETED | OUTPATIENT
Start: 2022-07-15 | End: 2022-07-15

## 2022-07-15 RX ADMIN — TRAMADOL HYDROCHLORIDE 50 MILLIGRAM(S): 50 TABLET ORAL at 06:19

## 2022-07-15 RX ADMIN — OXYCODONE AND ACETAMINOPHEN 2 TABLET(S): 5; 325 TABLET ORAL at 10:25

## 2022-07-15 RX ADMIN — OXYCODONE AND ACETAMINOPHEN 2 TABLET(S): 5; 325 TABLET ORAL at 18:00

## 2022-07-15 RX ADMIN — Medication 600 MILLIGRAM(S): at 09:30

## 2022-07-15 RX ADMIN — SENNA PLUS 2 TABLET(S): 8.6 TABLET ORAL at 21:15

## 2022-07-15 RX ADMIN — MORPHINE SULFATE 0.5 MILLIGRAM(S): 50 CAPSULE, EXTENDED RELEASE ORAL at 21:15

## 2022-07-15 RX ADMIN — Medication 250 MILLIGRAM(S): at 06:26

## 2022-07-15 RX ADMIN — OXYCODONE AND ACETAMINOPHEN 2 TABLET(S): 5; 325 TABLET ORAL at 09:31

## 2022-07-15 RX ADMIN — Medication 1000 MILLIGRAM(S): at 02:33

## 2022-07-15 RX ADMIN — Medication 400 MILLIGRAM(S): at 02:03

## 2022-07-15 RX ADMIN — AMLODIPINE BESYLATE 5 MILLIGRAM(S): 2.5 TABLET ORAL at 05:25

## 2022-07-15 RX ADMIN — POLYETHYLENE GLYCOL 3350 17 GRAM(S): 17 POWDER, FOR SOLUTION ORAL at 12:31

## 2022-07-15 RX ADMIN — LISINOPRIL 40 MILLIGRAM(S): 2.5 TABLET ORAL at 05:25

## 2022-07-15 RX ADMIN — HEPARIN SODIUM 5000 UNIT(S): 5000 INJECTION INTRAVENOUS; SUBCUTANEOUS at 21:16

## 2022-07-15 RX ADMIN — Medication 600 MILLIGRAM(S): at 08:42

## 2022-07-15 RX ADMIN — LACTULOSE 10 GRAM(S): 10 SOLUTION ORAL at 18:01

## 2022-07-15 RX ADMIN — Medication 250 MILLIGRAM(S): at 18:01

## 2022-07-15 RX ADMIN — HEPARIN SODIUM 5000 UNIT(S): 5000 INJECTION INTRAVENOUS; SUBCUTANEOUS at 08:42

## 2022-07-15 RX ADMIN — MORPHINE SULFATE 0.5 MILLIGRAM(S): 50 CAPSULE, EXTENDED RELEASE ORAL at 21:45

## 2022-07-15 RX ADMIN — SODIUM CHLORIDE 75 MILLILITER(S): 9 INJECTION, SOLUTION INTRAVENOUS at 05:20

## 2022-07-15 RX ADMIN — Medication 250 MILLIGRAM(S): at 12:31

## 2022-07-15 RX ADMIN — TRAMADOL HYDROCHLORIDE 50 MILLIGRAM(S): 50 TABLET ORAL at 05:25

## 2022-07-15 RX ADMIN — Medication 250 MILLIGRAM(S): at 01:50

## 2022-07-15 NOTE — PROGRESS NOTE ADULT - SUBJECTIVE AND OBJECTIVE BOX
Subjective: Patient was seen and examined this AM. Urinating without difficulty. No hematuria. No dysuria. Medicine managing hypertension.     MEDICATIONS  (STANDING):  amLODIPine   Tablet 5 milliGRAM(s) Oral daily  cephalexin 250 milliGRAM(s) Oral every 6 hours  heparin   Injectable 5000 Unit(s) SubCutaneous every 12 hours  lactulose Syrup 10 Gram(s) Oral two times a day  lisinopril 40 milliGRAM(s) Oral daily  polyethylene glycol 3350 17 Gram(s) Oral daily  senna 2 Tablet(s) Oral at bedtime    MEDICATIONS  (PRN):  hydrALAZINE 25 milliGRAM(s) Oral every 6 hours PRN SBP >160  oxycodone    5 mG/acetaminophen 325 mG 1 Tablet(s) Oral every 4 hours PRN Moderate Pain (4 - 6)  oxycodone    5 mG/acetaminophen 325 mG 2 Tablet(s) Oral every 4 hours PRN Severe Pain (7 - 10)  traMADol 25 milliGRAM(s) Oral every 6 hours PRN Moderate Pain (4 - 6)  traMADol 50 milliGRAM(s) Oral every 6 hours PRN Severe Pain (7 - 10)      Vital Signs Last 24 Hrs  T(C): 36.7 (15 Jul 2022 09:35), Max: 37.1 (15 Jul 2022 08:29)  T(F): 98 (15 Jul 2022 09:35), Max: 98.7 (15 Jul 2022 08:29)  HR: 98 (15 Jul 2022 09:35) (80 - 98)  BP: 169/87 (15 Jul 2022 09:35) (150/80 - 188/80)  BP(mean): --  RR: 19 (15 Jul 2022 08:29) (18 - 19)  SpO2: 95% (15 Jul 2022 08:29) (94% - 98%)    Parameters below as of 15 Jul 2022 08:29  Patient On (Oxygen Delivery Method): room air    07-14  -  07-15  --------------------------------------------------------  IN:  Total IN: 0 mL    OUT:    Voided (mL): 800 mL  Total OUT: 800 mL    Total NET: -800 mL    Physical Exam:    Constitutional: NAD  HEENT: PERRL, EOMI  Neck: No JVD, FROM without pain  Respiratory: Respirations non-labored, no accessory muscle use  Cardiovascular: Regular rate & rhythm  Gastrointestinal: Soft, non-tender, non-distended  : Voiding spontaneously. No hematuria, no dysuria    LABS:                        13.0   7.69  )-----------( 218      ( 14 Jul 2022 07:35 )             37.5     07-15    120<LL>  |  83<L>  |  11.4  ----------------------------<  114<H>  4.1   |  27.0  |  0.47<L>    Ca    8.4<L>      15 Jul 2022 07:22  Phos  2.8     07-14  Mg     1.9     07-15

## 2022-07-15 NOTE — DISCHARGE NOTE NURSING/CASE MANAGEMENT/SOCIAL WORK - NSDCPEFALRISK_GEN_ALL_CORE
For information on Fall & Injury Prevention, visit: [email or web address] OR  [email or web address] OR  https://www.cdc.gov/steadi/patient.html

## 2022-07-15 NOTE — PROGRESS NOTE ADULT - ASSESSMENT
Assessment: 89F s/p cysto with stent placement. Voiding spontaneously.     Plan:  Stable for urologic standpoint.  Continue to monitor UOP  Will sign off. Please call back with any questions or concerns.   BP management and dispo per medicine primary team when medically stable

## 2022-07-15 NOTE — PROGRESS NOTE ADULT - SUBJECTIVE AND OBJECTIVE BOX
[facility] Division of Hospital Medicine    Chief Complaint:  Right flank pain, constipation.    SUBJECTIVE / OVERNIGHT EVENTS:  Patient states that she is still with right flank pain in spite of meds. States that pain is consistent and radiating to her back and inguinal area.  Patient denies chest pain, SOB, abd pain, N/V, fever, chills, dysuria or any other complaints. All remainder ROS negative.       I&O's Summary    14 Jul 2022 07:01  -  15 Jul 2022 07:00  --------------------------------------------------------  IN: 0 mL / OUT: 800 mL / NET: -800 mL      PHYSICAL EXAM:  Vital Signs Last 24 Hrs  T(C): 37.1 (15 Jul 2022 08:29), Max: 37.1 (15 Jul 2022 08:29)  T(F): 98.7 (15 Jul 2022 08:29), Max: 98.7 (15 Jul 2022 08:29)  HR: 95 (15 Jul 2022 08:29) (80 - 95)  BP: 160/88 (15 Jul 2022 08:29) (150/80 - 188/87)  BP(mean): --  RR: 19 (15 Jul 2022 08:29) (18 - 19)  SpO2: 95% (15 Jul 2022 08:29) (94% - 98%)    Parameters below as of 15 Jul 2022 08:29  Patient On (Oxygen Delivery Method): room air    Patient is calm and looks uncomfortable, alert and oriented, pleasant and corportative.  Patient is able speak fluently and effortless.     CONSTITUTIONAL: NAD, well-developed, well-groomed  ENMT: Moist oral mucosa, no pharyngeal injection or exudates; normal dentition  RESPIRATORY: Normal respiratory effort; lungs are clear to auscultation bilaterally  CARDIOVASCULAR: Regular rate and rhythm, normal S1 and S2, no murmur/rub/gallop; No lower extremity edema; Peripheral pulses are 2+ bilaterally  ABDOMEN: Nontender to palpation, normoactive bowel sounds, no rebound/guarding; No hepatosplenomegaly  MUSCLOSKELETAL:  Normal gait; no clubbing or cyanosis of digits; no joint swelling or tenderness to palpation  PSYCH: A+O to person, place, and time; affect appropriate  NEUROLOGY: CN 2-12 are intact and symmetric; no gross sensory deficits;   SKIN: No rashes; no palpable lesions    LABS:                        13.0   7.69  )-----------( 218      ( 14 Jul 2022 07:35 )             37.5     07-15    120<LL>  |  83<L>  |  11.4  ----------------------------<  114<H>  4.1   |  27.0  |  0.47<L>    Ca    8.4<L>      15 Jul 2022 07:22  Phos  2.8     07-14  Mg     1.9     07-15      Culture - Urine (collected 13 Jul 2022 02:25)  Source: Clean Catch Clean Catch (Midstream)  Final Report (14 Jul 2022 09:07):    <10,000 CFU/mL Normal Urogenital Mirian      MEDICATIONS  (STANDING):  amLODIPine   Tablet 5 milliGRAM(s) Oral daily  cephalexin 250 milliGRAM(s) Oral every 6 hours  heparin   Injectable 5000 Unit(s) SubCutaneous every 12 hours  lactulose Syrup 10 Gram(s) Oral two times a day  lisinopril 40 milliGRAM(s) Oral daily  polyethylene glycol 3350 17 Gram(s) Oral daily  senna 2 Tablet(s) Oral at bedtime    MEDICATIONS  (PRN):  hydrALAZINE 25 milliGRAM(s) Oral every 6 hours PRN SBP >160  oxycodone    5 mG/acetaminophen 325 mG 1 Tablet(s) Oral every 4 hours PRN Moderate Pain (4 - 6)  oxycodone    5 mG/acetaminophen 325 mG 2 Tablet(s) Oral every 4 hours PRN Severe Pain (7 - 10)  traMADol 25 milliGRAM(s) Oral every 6 hours PRN Moderate Pain (4 - 6)  traMADol 50 milliGRAM(s) Oral every 6 hours PRN Severe Pain (7 - 10)

## 2022-07-15 NOTE — DISCHARGE NOTE NURSING/CASE MANAGEMENT/SOCIAL WORK - PATIENT PORTAL LINK FT
You can access the FollowMyHealth Patient Portal offered by [facility] by registering at the following website: [email or web address]. By joining [employer]’s FollowMyHealth portal, you will also be able to view your health information using other applications (apps) compatible with our system.

## 2022-07-15 NOTE — PROGRESS NOTE ADULT - ASSESSMENT
88 y/o female with left obstructing renal stone s/p cystoscopy and stent placement, Uncontrolled HTN, Chronic back pain from lumbar compression fractures.  Is still with pain and elevated BP post procedure.

## 2022-07-16 DIAGNOSIS — R33.9 RETENTION OF URINE, UNSPECIFIED: ICD-10-CM

## 2022-07-16 LAB
ANION GAP SERPL CALC-SCNC: 10 MMOL/L — SIGNIFICANT CHANGE UP (ref 5–17)
ANION GAP SERPL CALC-SCNC: 11 MMOL/L — SIGNIFICANT CHANGE UP (ref 5–17)
BASOPHILS # BLD AUTO: 0.01 K/UL — SIGNIFICANT CHANGE UP (ref 0–0.2)
BASOPHILS NFR BLD AUTO: 0.1 % — SIGNIFICANT CHANGE UP (ref 0–2)
BUN SERPL-MCNC: 18.3 MG/DL — SIGNIFICANT CHANGE UP (ref 8–20)
BUN SERPL-MCNC: 19.3 MG/DL — SIGNIFICANT CHANGE UP (ref 8–20)
CALCIUM SERPL-MCNC: 8.6 MG/DL — SIGNIFICANT CHANGE UP (ref 8.6–10.2)
CALCIUM SERPL-MCNC: 8.6 MG/DL — SIGNIFICANT CHANGE UP (ref 8.6–10.2)
CHLORIDE SERPL-SCNC: 84 MMOL/L — LOW (ref 98–107)
CHLORIDE SERPL-SCNC: 85 MMOL/L — LOW (ref 98–107)
CO2 SERPL-SCNC: 25 MMOL/L — SIGNIFICANT CHANGE UP (ref 22–29)
CO2 SERPL-SCNC: 26 MMOL/L — SIGNIFICANT CHANGE UP (ref 22–29)
CREAT SERPL-MCNC: 0.53 MG/DL — SIGNIFICANT CHANGE UP (ref 0.5–1.3)
CREAT SERPL-MCNC: 0.68 MG/DL — SIGNIFICANT CHANGE UP (ref 0.5–1.3)
EGFR: 83 ML/MIN/1.73M2 — SIGNIFICANT CHANGE UP
EGFR: 88 ML/MIN/1.73M2 — SIGNIFICANT CHANGE UP
EOSINOPHIL # BLD AUTO: 0.14 K/UL — SIGNIFICANT CHANGE UP (ref 0–0.5)
EOSINOPHIL NFR BLD AUTO: 1.7 % — SIGNIFICANT CHANGE UP (ref 0–6)
GLUCOSE SERPL-MCNC: 103 MG/DL — HIGH (ref 70–99)
GLUCOSE SERPL-MCNC: 113 MG/DL — HIGH (ref 70–99)
HCT VFR BLD CALC: 38.5 % — SIGNIFICANT CHANGE UP (ref 34.5–45)
HGB BLD-MCNC: 13.7 G/DL — SIGNIFICANT CHANGE UP (ref 11.5–15.5)
IMM GRANULOCYTES NFR BLD AUTO: 0.4 % — SIGNIFICANT CHANGE UP (ref 0–1.5)
LYMPHOCYTES # BLD AUTO: 1.85 K/UL — SIGNIFICANT CHANGE UP (ref 1–3.3)
LYMPHOCYTES # BLD AUTO: 23.1 % — SIGNIFICANT CHANGE UP (ref 13–44)
MCHC RBC-ENTMCNC: 32 PG — SIGNIFICANT CHANGE UP (ref 27–34)
MCHC RBC-ENTMCNC: 35.6 GM/DL — SIGNIFICANT CHANGE UP (ref 32–36)
MCV RBC AUTO: 90 FL — SIGNIFICANT CHANGE UP (ref 80–100)
MONOCYTES # BLD AUTO: 0.89 K/UL — SIGNIFICANT CHANGE UP (ref 0–0.9)
MONOCYTES NFR BLD AUTO: 11.1 % — SIGNIFICANT CHANGE UP (ref 2–14)
NEUTROPHILS # BLD AUTO: 5.1 K/UL — SIGNIFICANT CHANGE UP (ref 1.8–7.4)
NEUTROPHILS NFR BLD AUTO: 63.6 % — SIGNIFICANT CHANGE UP (ref 43–77)
PLATELET # BLD AUTO: 255 K/UL — SIGNIFICANT CHANGE UP (ref 150–400)
POTASSIUM SERPL-MCNC: 4.1 MMOL/L — SIGNIFICANT CHANGE UP (ref 3.5–5.3)
POTASSIUM SERPL-MCNC: 4.4 MMOL/L — SIGNIFICANT CHANGE UP (ref 3.5–5.3)
POTASSIUM SERPL-SCNC: 4.1 MMOL/L — SIGNIFICANT CHANGE UP (ref 3.5–5.3)
POTASSIUM SERPL-SCNC: 4.4 MMOL/L — SIGNIFICANT CHANGE UP (ref 3.5–5.3)
RBC # BLD: 4.28 M/UL — SIGNIFICANT CHANGE UP (ref 3.8–5.2)
RBC # FLD: 11.8 % — SIGNIFICANT CHANGE UP (ref 10.3–14.5)
SODIUM SERPL-SCNC: 120 MMOL/L — CRITICAL LOW (ref 135–145)
SODIUM SERPL-SCNC: 121 MMOL/L — LOW (ref 135–145)
WBC # BLD: 8.02 K/UL — SIGNIFICANT CHANGE UP (ref 3.8–10.5)
WBC # FLD AUTO: 8.02 K/UL — SIGNIFICANT CHANGE UP (ref 3.8–10.5)

## 2022-07-16 PROCEDURE — 99233 SBSQ HOSP IP/OBS HIGH 50: CPT

## 2022-07-16 PROCEDURE — 99231 SBSQ HOSP IP/OBS SF/LOW 25: CPT

## 2022-07-16 RX ORDER — TAMSULOSIN HYDROCHLORIDE 0.4 MG/1
0.4 CAPSULE ORAL AT BEDTIME
Refills: 0 | Status: DISCONTINUED | OUTPATIENT
Start: 2022-07-16 | End: 2022-07-18

## 2022-07-16 RX ORDER — SODIUM CHLORIDE 9 MG/ML
1 INJECTION INTRAMUSCULAR; INTRAVENOUS; SUBCUTANEOUS THREE TIMES A DAY
Refills: 0 | Status: DISCONTINUED | OUTPATIENT
Start: 2022-07-16 | End: 2022-07-16

## 2022-07-16 RX ORDER — MULTIVIT WITH MIN/MFOLATE/K2 340-15/3 G
1 POWDER (GRAM) ORAL ONCE
Refills: 0 | Status: COMPLETED | OUTPATIENT
Start: 2022-07-16 | End: 2022-07-16

## 2022-07-16 RX ORDER — KETOROLAC TROMETHAMINE 30 MG/ML
15 SYRINGE (ML) INJECTION ONCE
Refills: 0 | Status: DISCONTINUED | OUTPATIENT
Start: 2022-07-16 | End: 2022-07-16

## 2022-07-16 RX ORDER — UREA 15 G
30 POWDER IN PACKET (EA) ORAL
Refills: 0 | Status: DISCONTINUED | OUTPATIENT
Start: 2022-07-16 | End: 2022-07-18

## 2022-07-16 RX ADMIN — AMLODIPINE BESYLATE 5 MILLIGRAM(S): 2.5 TABLET ORAL at 05:52

## 2022-07-16 RX ADMIN — OXYCODONE AND ACETAMINOPHEN 2 TABLET(S): 5; 325 TABLET ORAL at 12:20

## 2022-07-16 RX ADMIN — LISINOPRIL 40 MILLIGRAM(S): 2.5 TABLET ORAL at 05:52

## 2022-07-16 RX ADMIN — OXYCODONE AND ACETAMINOPHEN 2 TABLET(S): 5; 325 TABLET ORAL at 13:15

## 2022-07-16 RX ADMIN — OXYCODONE AND ACETAMINOPHEN 2 TABLET(S): 5; 325 TABLET ORAL at 18:49

## 2022-07-16 RX ADMIN — Medication 25 MILLIGRAM(S): at 21:19

## 2022-07-16 RX ADMIN — Medication 15 MILLIGRAM(S): at 22:45

## 2022-07-16 RX ADMIN — Medication 10 MILLIGRAM(S): at 13:02

## 2022-07-16 RX ADMIN — LACTULOSE 10 GRAM(S): 10 SOLUTION ORAL at 05:53

## 2022-07-16 RX ADMIN — LACTULOSE 10 GRAM(S): 10 SOLUTION ORAL at 17:56

## 2022-07-16 RX ADMIN — Medication 15 MILLIGRAM(S): at 22:25

## 2022-07-16 RX ADMIN — OXYCODONE AND ACETAMINOPHEN 2 TABLET(S): 5; 325 TABLET ORAL at 02:07

## 2022-07-16 RX ADMIN — SENNA PLUS 2 TABLET(S): 8.6 TABLET ORAL at 21:15

## 2022-07-16 RX ADMIN — Medication 250 MILLIGRAM(S): at 12:21

## 2022-07-16 RX ADMIN — HEPARIN SODIUM 5000 UNIT(S): 5000 INJECTION INTRAVENOUS; SUBCUTANEOUS at 12:21

## 2022-07-16 RX ADMIN — Medication 1 BOTTLE: at 22:36

## 2022-07-16 RX ADMIN — Medication 25 MILLIGRAM(S): at 01:19

## 2022-07-16 RX ADMIN — Medication 30 GRAM(S): at 17:56

## 2022-07-16 RX ADMIN — SODIUM CHLORIDE 1 GRAM(S): 9 INJECTION INTRAMUSCULAR; INTRAVENOUS; SUBCUTANEOUS at 12:21

## 2022-07-16 RX ADMIN — POLYETHYLENE GLYCOL 3350 17 GRAM(S): 17 POWDER, FOR SOLUTION ORAL at 12:21

## 2022-07-16 RX ADMIN — TAMSULOSIN HYDROCHLORIDE 0.4 MILLIGRAM(S): 0.4 CAPSULE ORAL at 21:20

## 2022-07-16 RX ADMIN — OXYCODONE AND ACETAMINOPHEN 2 TABLET(S): 5; 325 TABLET ORAL at 01:07

## 2022-07-16 RX ADMIN — OXYCODONE AND ACETAMINOPHEN 2 TABLET(S): 5; 325 TABLET ORAL at 17:56

## 2022-07-16 RX ADMIN — Medication 1 ENEMA: at 21:11

## 2022-07-16 RX ADMIN — Medication 250 MILLIGRAM(S): at 01:02

## 2022-07-16 RX ADMIN — HEPARIN SODIUM 5000 UNIT(S): 5000 INJECTION INTRAVENOUS; SUBCUTANEOUS at 22:36

## 2022-07-16 RX ADMIN — Medication 250 MILLIGRAM(S): at 05:53

## 2022-07-16 RX ADMIN — Medication 250 MILLIGRAM(S): at 17:56

## 2022-07-16 NOTE — PROGRESS NOTE ADULT - PROBLEM SELECTOR PLAN 1
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
Care per Urology  Cont. Abx  Pain control: added ibupro  OOB, ambulate PT program  DVT-P with sub q heparin  Arrange for o/p Urology f/u on DC  Fall risk, ambulate with assistance
Na 120  Stop IV fluid, restrict PO fluid to 1 L / day  stat urine lytes  recheck sodium level at 1 PM.

## 2022-07-16 NOTE — PROGRESS NOTE ADULT - PROBLEM SELECTOR PLAN 4
lumbar compression fx:  CTs on admission reviewed   Fall risk  Tyelnol, toradol  PT program  o/p f/u
lumbar compression fx:  CTs on admission reviewed   Fall risk  Tyelnol, toradol  PT program  o/p f/u
Add polyeth glycol and senna

## 2022-07-16 NOTE — PROGRESS NOTE ADULT - PROBLEM SELECTOR PLAN 5
One week as per patient without any BM  lactulose Syrup 10 Gram(s) Oral two times a day  polyethylene glycol 3350 17 Gram(s) Oral daily  senna 2 Tablet(s) Oral at bedtime
One week as per patient without any BM  lactulose Syrup 10 Gram(s) Oral two times a day  polyethylene glycol 3350 17 Gram(s) Oral daily  senna 2 Tablet(s) Oral at bedtime    added bisacodyl suppository today  will add enema PRN if no BM today
heparin   Injectable 5000 Unit(s) SubCutaneous every 12 hours

## 2022-07-16 NOTE — PROGRESS NOTE ADULT - SUBJECTIVE AND OBJECTIVE BOX
CC: renal stone (16 Jul 2022 10:37)    INTERVAL HPI/OVERNIGHT EVENTS:  hyponatremic overnight with some improvement from 119 to 121  patient reports constipation  no BM in a week she states  also with urinary retention - this morning up to 400CC of urine    Vital Signs Last 24 Hrs  T(C): 36.6 (16 Jul 2022 12:12), Max: 36.8 (15 Jul 2022 17:39)  T(F): 97.9 (16 Jul 2022 12:12), Max: 98.2 (15 Jul 2022 17:39)  HR: 86 (16 Jul 2022 12:12) (70 - 88)  BP: 171/75 (16 Jul 2022 12:12) (133/79 - 181/77)  BP(mean): --  RR: 16 (16 Jul 2022 12:12) (16 - 18)  SpO2: 94% (16 Jul 2022 12:12) (92% - 95%)    Parameters below as of 16 Jul 2022 12:12  Patient On (Oxygen Delivery Method): room air    PHYSICAL EXAM:  General: in no acute distress  Eyes: PERRLA, EOMI; conjunctiva and sclera clear  Head: Normocephalic; atraumatic  ENMT: No nasal discharge; airway clear  Neck: Supple; non tender; no masses  Respiratory: No wheezes, rales or rhonchi  Cardiovascular: Regular rate and rhythm. S1 and S2 Normal; No murmurs, gallops or rubs  Gastrointestinal: Soft tender in lower abdomen; non-distended; Normal bowel sounds  Genitourinary: No costovertebral angle tenderness  Extremities: Normal range of motion, No clubbing, cyanosis or edema  Vascular: Peripheral pulses palpable 2+ bilaterally  Neurological: Alert and oriented x4  Skin: Warm and dry. No acute rash  Psychiatric: Cooperative and appropriate    I&O's Detail    15 Jul 2022 07:01  -  16 Jul 2022 07:00  --------------------------------------------------------  IN:  Total IN: 0 mL    OUT:    Intermittent Catheterization - Urethral (mL): 525 mL  Total OUT: 525 mL    Total NET: -525 mL                      13.7   8.02  )-----------( 255      ( 16 Jul 2022 07:33 )             38.5     16 Jul 2022 07:33    121    |  85     |  18.3   ----------------------------<  103    4.4     |  25.0   |  0.53     Ca    8.6        16 Jul 2022 07:33  Mg     1.9       15 Jul 2022 07:22    CAPILLARY BLOOD GLUCOSE    MEDICATIONS  (STANDING):  amLODIPine   Tablet 5 milliGRAM(s) Oral daily  cephalexin 250 milliGRAM(s) Oral every 6 hours  heparin   Injectable 5000 Unit(s) SubCutaneous every 12 hours  lactulose Syrup 10 Gram(s) Oral two times a day  lisinopril 40 milliGRAM(s) Oral daily  polyethylene glycol 3350 17 Gram(s) Oral daily  senna 2 Tablet(s) Oral at bedtime  sodium chloride 1 Gram(s) Oral three times a day  tamsulosin 0.4 milliGRAM(s) Oral at bedtime    MEDICATIONS  (PRN):  bisacodyl Suppository 10 milliGRAM(s) Rectal daily PRN Constipation  hydrALAZINE 25 milliGRAM(s) Oral every 6 hours PRN SBP >160  oxycodone    5 mG/acetaminophen 325 mG 1 Tablet(s) Oral every 4 hours PRN Moderate Pain (4 - 6)  oxycodone    5 mG/acetaminophen 325 mG 2 Tablet(s) Oral every 4 hours PRN Severe Pain (7 - 10)  traMADol 25 milliGRAM(s) Oral every 6 hours PRN Moderate Pain (4 - 6)  traMADol 50 milliGRAM(s) Oral every 6 hours PRN Severe Pain (7 - 10)      RADIOLOGY & ADDITIONAL TESTS:

## 2022-07-16 NOTE — PROGRESS NOTE ADULT - SUBJECTIVE AND OBJECTIVE BOX
Urology    SUBJECTIVE: Pt seen and examined on rounds with team.  POD #3 cysto with stent.  Pt. received a straight catheter last night, drained 525ml.  Has not voided since.  No acute events overnight.        OBJECTIVE    PHYSICAL EXAM:   General: NAD, Lying in bed, comfortable  Neuro: Awake and alert  GI/Abd: Soft, NT/ND,       VITALS  T(C): 36.6 (07-16-22 @ 08:50), Max: 36.8 (07-15-22 @ 17:39)  HR: 72 (07-16-22 @ 08:50) (70 - 88)  BP: 142/82 (07-16-22 @ 08:50) (133/79 - 181/77)  RR: 18 (07-16-22 @ 08:50) (18 - 18)  SpO2: 92% (07-16-22 @ 08:50) (92% - 95%)  CAPILLARY BLOOD GLUCOSE          Is/Os    07-15 @ 07:01  -  07-16 @ 07:00  --------------------------------------------------------  IN:  Total IN: 0 mL    OUT:    Intermittent Catheterization - Urethral (mL): 525 mL  Total OUT: 525 mL    Total NET: -525 mL          MEDICATIONS (STANDING): amLODIPine   Tablet 5 milliGRAM(s) Oral daily  cephalexin 250 milliGRAM(s) Oral every 6 hours  heparin   Injectable 5000 Unit(s) SubCutaneous every 12 hours  lactulose Syrup 10 Gram(s) Oral two times a day  lisinopril 40 milliGRAM(s) Oral daily  polyethylene glycol 3350 17 Gram(s) Oral daily  senna 2 Tablet(s) Oral at bedtime  sodium chloride 1 Gram(s) Oral three times a day    MEDICATIONS (PRN):hydrALAZINE 25 milliGRAM(s) Oral every 6 hours PRN SBP >160  oxycodone    5 mG/acetaminophen 325 mG 1 Tablet(s) Oral every 4 hours PRN Moderate Pain (4 - 6)  oxycodone    5 mG/acetaminophen 325 mG 2 Tablet(s) Oral every 4 hours PRN Severe Pain (7 - 10)  traMADol 25 milliGRAM(s) Oral every 6 hours PRN Moderate Pain (4 - 6)  traMADol 50 milliGRAM(s) Oral every 6 hours PRN Severe Pain (7 - 10)      LABS  CBC (07-16 @ 07:33)                              13.7                           8.02    )----------------(  255        63.6  % Neutrophils, 23.1  % Lymphocytes, ANC: 5.10                                38.5      BMP (07-16 @ 07:33)             121<L>  |  85<L>   |  18.3  		Ca++ --      Ca 8.6                ---------------------------------( 103<H>		Mg --                 4.4     |  25.0    |  0.53  			Ph --      BMP (07-15 @ 13:15)             119<LL>  |  81<L>   |  13.5  		Ca++ --      Ca 8.8                ---------------------------------( 123<H>		Mg --                 4.2     |  27.0    |  0.64  			Ph --                         Urology    SUBJECTIVE: Pt seen and examined on rounds with team.  POD #3 cysto with stent.  Pt. received a straight catheter last night, drained 525ml.  Has not voided since.  No acute events overnight.        OBJECTIVE    PHYSICAL EXAM:   General: NAD, Lying in bed, comfortable  Neuro: Awake and alert  GI/Abd: Soft, NT/ND,       VITALS  T(C): 36.6 (07-16-22 @ 08:50), Max: 36.8 (07-15-22 @ 17:39)  HR: 72 (07-16-22 @ 08:50) (70 - 88)  BP: 142/82 (07-16-22 @ 08:50) (133/79 - 181/77)  RR: 18 (07-16-22 @ 08:50) (18 - 18)  SpO2: 92% (07-16-22 @ 08:50) (92% - 95%)  CAPILLARY BLOOD GLUCOSE          Is/Os    07-15 @ 07:01  -  07-16 @ 07:00  --------------------------------------------------------  IN:  Total IN: 0 mL    OUT:    Intermittent Catheterization - Urethral (mL): 525 mL  Total OUT: 525 mL    Total NET: -525 mL          MEDICATIONS (STANDING): amLODIPine   Tablet 5 milliGRAM(s) Oral daily  cephalexin 250 milliGRAM(s) Oral every 6 hours  heparin   Injectable 5000 Unit(s) SubCutaneous every 12 hours  lactulose Syrup 10 Gram(s) Oral two times a day  lisinopril 40 milliGRAM(s) Oral daily  polyethylene glycol 3350 17 Gram(s) Oral daily  senna 2 Tablet(s) Oral at bedtime  sodium chloride 1 Gram(s) Oral three times a day    MEDICATIONS (PRN):hydrALAZINE 25 milliGRAM(s) Oral every 6 hours PRN SBP >160  oxycodone    5 mG/acetaminophen 325 mG 1 Tablet(s) Oral every 4 hours PRN Moderate Pain (4 - 6)  oxycodone    5 mG/acetaminophen 325 mG 2 Tablet(s) Oral every 4 hours PRN Severe Pain (7 - 10)  traMADol 25 milliGRAM(s) Oral every 6 hours PRN Moderate Pain (4 - 6)  traMADol 50 milliGRAM(s) Oral every 6 hours PRN Severe Pain (7 - 10)      LABS  CBC (07-16 @ 07:33)                              13.7                           8.02    )----------------(  255        63.6  % Neutrophils, 23.1  % Lymphocytes, ANC: 5.10                                38.5      BMP (07-16 @ 07:33)             121<L>  |  85<L>   |  18.3  		Ca++ --      Ca 8.6                ---------------------------------( 103<H>		Mg --                 4.4     |  25.0    |  0.53  			Ph --      BMP (07-15 @ 13:15)             119<LL>  |  81<L>   |  13.5  		Ca++ --      Ca 8.8                ---------------------------------( 123<H>		Mg --                 4.2     |  27.0    |  0.64  			Ph --      .

## 2022-07-16 NOTE — PROGRESS NOTE ADULT - PROBLEM SELECTOR PLAN 2
Uncontrolled  As per patient even at home her pressure was always up and she was taking 2 instead on lisinopril pills to get her pressure down.  change lisinopril to 40 mg and continue amlodipine 5 mg.  DASH diet  PRN Hydralazine
Care per Urology  Cont. Abx  Pain control:   oxycodone    5 mG/acetaminophen 325 mG 1 Tablet(s) Oral every 4 hours PRN Moderate Pain (4 - 6)  oxycodone    5 mG/acetaminophen 325 mG 2 Tablet(s) Oral every 4 hours PRN Severe Pain (7 - 10)  traMADol 25 milliGRAM(s) Oral every 6 hours PRN Moderate Pain (4 - 6)  traMADol 50 milliGRAM(s) Oral every 6 hours PRN Severe Pain (7 - 10)  OOB, ambulate PT program  DVT-P with sub q heparin  Arrange for o/p Urology f/u on DC  Fall risk, ambulate with assistance
Care per Urology  Cont. Abx  Pain control:   oxycodone    5 mG/acetaminophen 325 mG 1 Tablet(s) Oral every 4 hours PRN Moderate Pain (4 - 6)  oxycodone    5 mG/acetaminophen 325 mG 2 Tablet(s) Oral every 4 hours PRN Severe Pain (7 - 10)  traMADol 25 milliGRAM(s) Oral every 6 hours PRN Moderate Pain (4 - 6)  traMADol 50 milliGRAM(s) Oral every 6 hours PRN Severe Pain (7 - 10)  OOB, ambulate PT program  DVT-P with sub q heparin  Arrange for o/p Urology f/u on DC  Fall risk, ambulate with assistance

## 2022-07-16 NOTE — CHART NOTE - NSCHARTNOTEFT_GEN_A_CORE
Pt admitt  Called by RN for abdominal flank pain and constipation with no BM in 1-2 weeks.  Pt seen and examined at bedside, she is resting comfortably in NAD. Per pt, hasn't had a BM for atleast a week. She is also c/o generalized abdominal pain radiating to her flanks which isn't new Pt admitted for left renal stone s/p cystoscopy and stent placement.   Called by RN for abdominal flank pain and constipation with no BM in 1-2 weeks.  Pt seen and examined at bedside, she is resting comfortably in NAD. Per pt, hasn't had a BM for atleast a week but is passing gas. She is also c/o generalized abdominal pain radiating to her flanks which isn't new. Pain has been managed during stay with percocet and tramadol     VSS and repeated at bedside, unlabored respirations, RRR, abdominal soft, nontender, and nondistended, no CVA tenderness     Has had various medications for constipation including senna, lactulose syrup, miralax, dulcolax suppository, fleet enema and still unable to have a BM.     Will give Mg citrate bottle   IV toradol for pain Pt admitted for left renal stone s/p cystoscopy and stent placement.   Called by RN for abdominal flank pain and constipation with no BM in 1-2 weeks.  Pt seen and examined at bedside, she is resting comfortably in NAD. Per pt, hasn't had a BM for atleast a week but is passing gas. She is also c/o generalized abdominal pain radiating to her flanks which isn't new. Pain has been managed during stay with percocet and tramadol   Has had various medications for constipation including senna, lactulose syrup, miralax, dulcolax suppository, fleet enema and still unable to have a BM.    VSS and repeated at bedside, unlabored respirations, RRR, abdominal soft, nontender, and nondistended, no CVA tenderness     Will give Mg citrate bottle x1  15 mg IV push toradol for pain  Will continue to monitor. Pt admitted for left renal stone s/p cystoscopy and stent placement.   Called by RN for abdominal flank pain and constipation with no BM in 1-2 weeks.  Pt seen and examined at bedside, she is resting comfortably in NAD. Per pt, hasn't had a BM for atleast a week but is passing gas. She is also c/o generalized abdominal pain radiating to her flanks which isn't new. Pain has been managed during stay with percocet and tramadol   Has had various medications for constipation including senna, lactulose syrup, miralax, dulcolax suppository, fleet enema and still unable to have a BM.    VSS and repeated at bedside, unlabored respirations, RRR, abdominal soft, nontender, and nondistended, no CVA tenderness     Will give Mg citrate bottle x1  15 mg IV push toradol for pain  Will continue to monitor.    --------------------------------------------------------------------------------------------------------  0600  Called by RN that pt is vomiting, a tea spoon amount of blackish/red   Ordered STAT CBC and Abdominal flat & upright to r/o bowel obstruction

## 2022-07-16 NOTE — PROGRESS NOTE ADULT - PROBLEM SELECTOR PLAN 3
Uncontrolled  As per patient even at home her pressure was always up and she was taking 2 instead on lisinopril pills to get her pressure down.  change lisinopril to 40 mg and continue amlodipine 5 mg.  DASH diet  PRN Hydralazine
Uncontrolled  As per patient even at home her pressure was always up and she was taking 2 instead on lisinopril pills to get her pressure down.  change lisinopril to 40 mg and continue amlodipine 5 mg.  DASH diet  PRN Hydralazine
lumbar compression fx:  CTs on admission reviewed   Fall risk  Tyelnol, toradol  PT program  o/p f/u

## 2022-07-16 NOTE — PROGRESS NOTE ADULT - ASSESSMENT
88 y/o woman POD #3 cysto with stent for Left proximal 9x13 mm stone.  Pt. required straight cath last night.  -OOB, ambulate  -Bowel regimen  -consider restarting flomax  -f/u with urology as an outpatient  -d/w Dr. Alanis 88 y/o woman POD #3 cysto with stent for Left proximal 9x13 mm stone.  Pt. required straight cath last night.  -continue management per primary team  -OOB, ambulate  -Bowel regimen  -consider restarting flomax  -f/u with urology as an outpatient  -d/w Dr. Alanis

## 2022-07-16 NOTE — PROGRESS NOTE ADULT - PROBLEM SELECTOR PLAN 6
heparin   Injectable 5000 Unit(s) SubCutaneous every 12 hours
heparin   Injectable 5000 Unit(s) SubCutaneous every 12 hours
anticipate d/c home

## 2022-07-16 NOTE — PROVIDER CONTACT NOTE (CRITICAL VALUE NOTIFICATION) - ASSESSMENT
A&Ox4. VSS. She denies chest pain or SOB. She does not show any s/s of distress or discomfort. Pt retaining urine and was straight cath'd this afternoon.

## 2022-07-16 NOTE — PROVIDER CONTACT NOTE (CRITICAL VALUE NOTIFICATION) - SITUATION
Sodium 120
Text message sent and delivered. Waiting on reply.
Text message sent and shows delivered. Awaiting for reply

## 2022-07-17 LAB
ANION GAP SERPL CALC-SCNC: 10 MMOL/L — SIGNIFICANT CHANGE UP (ref 5–17)
ANION GAP SERPL CALC-SCNC: 11 MMOL/L — SIGNIFICANT CHANGE UP (ref 5–17)
ANION GAP SERPL CALC-SCNC: 13 MMOL/L — SIGNIFICANT CHANGE UP (ref 5–17)
BUN SERPL-MCNC: 49.7 MG/DL — HIGH (ref 8–20)
BUN SERPL-MCNC: 52.2 MG/DL — HIGH (ref 8–20)
BUN SERPL-MCNC: 56.7 MG/DL — HIGH (ref 8–20)
CALCIUM SERPL-MCNC: 8.7 MG/DL — SIGNIFICANT CHANGE UP (ref 8.6–10.2)
CALCIUM SERPL-MCNC: 8.7 MG/DL — SIGNIFICANT CHANGE UP (ref 8.6–10.2)
CALCIUM SERPL-MCNC: 8.9 MG/DL — SIGNIFICANT CHANGE UP (ref 8.6–10.2)
CHLORIDE SERPL-SCNC: 84 MMOL/L — LOW (ref 98–107)
CHLORIDE SERPL-SCNC: 85 MMOL/L — LOW (ref 98–107)
CHLORIDE SERPL-SCNC: 86 MMOL/L — LOW (ref 98–107)
CO2 SERPL-SCNC: 26 MMOL/L — SIGNIFICANT CHANGE UP (ref 22–29)
CO2 SERPL-SCNC: 28 MMOL/L — SIGNIFICANT CHANGE UP (ref 22–29)
CO2 SERPL-SCNC: 28 MMOL/L — SIGNIFICANT CHANGE UP (ref 22–29)
CREAT SERPL-MCNC: 0.65 MG/DL — SIGNIFICANT CHANGE UP (ref 0.5–1.3)
CREAT SERPL-MCNC: 0.66 MG/DL — SIGNIFICANT CHANGE UP (ref 0.5–1.3)
CREAT SERPL-MCNC: 0.67 MG/DL — SIGNIFICANT CHANGE UP (ref 0.5–1.3)
EGFR: 84 ML/MIN/1.73M2 — SIGNIFICANT CHANGE UP
GLUCOSE SERPL-MCNC: 123 MG/DL — HIGH (ref 70–99)
GLUCOSE SERPL-MCNC: 124 MG/DL — HIGH (ref 70–99)
GLUCOSE SERPL-MCNC: 129 MG/DL — HIGH (ref 70–99)
HCT VFR BLD CALC: 38 % — SIGNIFICANT CHANGE UP (ref 34.5–45)
HGB BLD-MCNC: 13.3 G/DL — SIGNIFICANT CHANGE UP (ref 11.5–15.5)
MCHC RBC-ENTMCNC: 31.8 PG — SIGNIFICANT CHANGE UP (ref 27–34)
MCHC RBC-ENTMCNC: 35 GM/DL — SIGNIFICANT CHANGE UP (ref 32–36)
MCV RBC AUTO: 90.9 FL — SIGNIFICANT CHANGE UP (ref 80–100)
PLATELET # BLD AUTO: 298 K/UL — SIGNIFICANT CHANGE UP (ref 150–400)
POTASSIUM SERPL-MCNC: 4 MMOL/L — SIGNIFICANT CHANGE UP (ref 3.5–5.3)
POTASSIUM SERPL-MCNC: 4.1 MMOL/L — SIGNIFICANT CHANGE UP (ref 3.5–5.3)
POTASSIUM SERPL-MCNC: 4.1 MMOL/L — SIGNIFICANT CHANGE UP (ref 3.5–5.3)
POTASSIUM SERPL-SCNC: 4 MMOL/L — SIGNIFICANT CHANGE UP (ref 3.5–5.3)
POTASSIUM SERPL-SCNC: 4.1 MMOL/L — SIGNIFICANT CHANGE UP (ref 3.5–5.3)
POTASSIUM SERPL-SCNC: 4.1 MMOL/L — SIGNIFICANT CHANGE UP (ref 3.5–5.3)
RBC # BLD: 4.18 M/UL — SIGNIFICANT CHANGE UP (ref 3.8–5.2)
RBC # FLD: 11.9 % — SIGNIFICANT CHANGE UP (ref 10.3–14.5)
SODIUM SERPL-SCNC: 123 MMOL/L — LOW (ref 135–145)
SODIUM SERPL-SCNC: 124 MMOL/L — LOW (ref 135–145)
SODIUM SERPL-SCNC: 124 MMOL/L — LOW (ref 135–145)
WBC # BLD: 10.09 K/UL — SIGNIFICANT CHANGE UP (ref 3.8–10.5)
WBC # FLD AUTO: 10.09 K/UL — SIGNIFICANT CHANGE UP (ref 3.8–10.5)

## 2022-07-17 PROCEDURE — 99233 SBSQ HOSP IP/OBS HIGH 50: CPT

## 2022-07-17 PROCEDURE — 74019 RADEX ABDOMEN 2 VIEWS: CPT | Mod: 26

## 2022-07-17 RX ORDER — SODIUM BICARBONATE 1 MEQ/ML
650 SYRINGE (ML) INTRAVENOUS THREE TIMES A DAY
Refills: 0 | Status: DISCONTINUED | OUTPATIENT
Start: 2022-07-17 | End: 2022-07-18

## 2022-07-17 RX ADMIN — Medication 30 GRAM(S): at 05:36

## 2022-07-17 RX ADMIN — LISINOPRIL 40 MILLIGRAM(S): 2.5 TABLET ORAL at 05:35

## 2022-07-17 RX ADMIN — Medication 250 MILLIGRAM(S): at 23:18

## 2022-07-17 RX ADMIN — LACTULOSE 10 GRAM(S): 10 SOLUTION ORAL at 05:34

## 2022-07-17 RX ADMIN — Medication 250 MILLIGRAM(S): at 17:04

## 2022-07-17 RX ADMIN — Medication 250 MILLIGRAM(S): at 05:35

## 2022-07-17 RX ADMIN — Medication 30 GRAM(S): at 17:04

## 2022-07-17 RX ADMIN — Medication 250 MILLIGRAM(S): at 10:59

## 2022-07-17 RX ADMIN — HEPARIN SODIUM 5000 UNIT(S): 5000 INJECTION INTRAVENOUS; SUBCUTANEOUS at 10:59

## 2022-07-17 RX ADMIN — TRAMADOL HYDROCHLORIDE 50 MILLIGRAM(S): 50 TABLET ORAL at 22:45

## 2022-07-17 RX ADMIN — HEPARIN SODIUM 5000 UNIT(S): 5000 INJECTION INTRAVENOUS; SUBCUTANEOUS at 23:18

## 2022-07-17 RX ADMIN — AMLODIPINE BESYLATE 5 MILLIGRAM(S): 2.5 TABLET ORAL at 05:34

## 2022-07-17 RX ADMIN — Medication 250 MILLIGRAM(S): at 00:32

## 2022-07-17 RX ADMIN — TAMSULOSIN HYDROCHLORIDE 0.4 MILLIGRAM(S): 0.4 CAPSULE ORAL at 21:51

## 2022-07-17 RX ADMIN — TRAMADOL HYDROCHLORIDE 50 MILLIGRAM(S): 50 TABLET ORAL at 21:51

## 2022-07-17 RX ADMIN — Medication 650 MILLIGRAM(S): at 21:51

## 2022-07-17 RX ADMIN — Medication 650 MILLIGRAM(S): at 14:42

## 2022-07-17 NOTE — PROGRESS NOTE ADULT - ASSESSMENT
88 y/o female with left obstructing renal stone s/p cystoscopy and stent placement, Uncontrolled HTN, Chronic back pain from lumbar compression fractures.  Is still with pain and elevated BP post procedure. 88 y/o female with left obstructing renal stone s/p cystoscopy and stent placement, Uncontrolled HTN, Chronic back pain from lumbar compression fractures.  Is still with pain and elevated BP post procedure.     Acute hyponatremia.   - Na 124  -on fluid restriction  -urine lytes - consistent with SIADH  -add sodium tablets  -f/u bmp    Left obstructing renal stone s/p cystoscopy and stent placement,  -WITH URINARY RETENTION  -ELIZABETH PLACEMENT  -Cont. Abx  -PAIN CONTROL  -Flomax  -f/u urology rec    Hypertension.   -stable  -continue lisinopril to 40 mg and continue amlodipine 5 mg.  DASH diet  PRN Hydralazine.     Lumbar compression fracture.   -Fall risk  - Tyelnol, toradol  -back brace  - PT program  - o/p f/u.    Constipation.   -small bm   lactulose Syrup 10 Gram(s) Oral two times a day  polyethylene glycol 3350 17 Gram(s) Oral daily  senna 2 Tablet(s) Oral at bedtime  added bisacodyl suppository today   add enema PRN if no BM today.  dvt ppx  heparin   Injectable 5000 Unit(s) SubCutaneous every 12 hours.      disposition : pending,nneds to improve Na level.  sw for safe discharge  care of plan vinod pt    .

## 2022-07-17 NOTE — PROGRESS NOTE ADULT - SUBJECTIVE AND OBJECTIVE BOX
Patient is a 89y old  Female who presents with a chief complaint of renal stone (16 Jul 2022 12:52)  pt is c/o mild lt flank discomfort , denies fever,chill,abd pain,n/v/d    REVIEW OF SYSTEMS: All systems are reviewed and found to be negative except above    MEDICATIONS  (STANDING):  amLODIPine   Tablet 5 milliGRAM(s) Oral daily  cephalexin 250 milliGRAM(s) Oral every 6 hours  heparin   Injectable 5000 Unit(s) SubCutaneous every 12 hours  lisinopril 40 milliGRAM(s) Oral daily  senna 2 Tablet(s) Oral at bedtime  tamsulosin 0.4 milliGRAM(s) Oral at bedtime  urea Oral Powder 30 Gram(s) Oral two times a day    MEDICATIONS  (PRN):  bisacodyl Suppository 10 milliGRAM(s) Rectal daily PRN Constipation  hydrALAZINE 25 milliGRAM(s) Oral every 6 hours PRN SBP >160  oxycodone    5 mG/acetaminophen 325 mG 1 Tablet(s) Oral every 4 hours PRN Moderate Pain (4 - 6)  oxycodone    5 mG/acetaminophen 325 mG 2 Tablet(s) Oral every 4 hours PRN Severe Pain (7 - 10)  traMADol 25 milliGRAM(s) Oral every 6 hours PRN Moderate Pain (4 - 6)  traMADol 50 milliGRAM(s) Oral every 6 hours PRN Severe Pain (7 - 10)      CAPILLARY BLOOD GLUCOSE        I&O's Summary    16 Jul 2022 07:01  -  17 Jul 2022 07:00  --------------------------------------------------------  IN: 0 mL / OUT: 700 mL / NET: -700 mL    17 Jul 2022 07:01  -  17 Jul 2022 13:45  --------------------------------------------------------  IN: 0 mL / OUT: 725 mL / NET: -725 mL        PHYSICAL EXAM:  Vital Signs Last 24 Hrs  T(C): 36.7 (17 Jul 2022 12:10), Max: 36.9 (16 Jul 2022 21:19)  T(F): 98.1 (17 Jul 2022 12:10), Max: 98.4 (16 Jul 2022 21:19)  HR: 82 (17 Jul 2022 12:10) (81 - 90)  BP: 118/73 (17 Jul 2022 12:10) (118/73 - 171/74)  BP(mean): --  RR: 16 (17 Jul 2022 12:10) (16 - 18)  SpO2: 92% (17 Jul 2022 12:10) (91% - 92%)    Parameters below as of 17 Jul 2022 12:10  Patient On (Oxygen Delivery Method): room air        CONSTITUTIONAL: NAD,  EYES: PERRLA; conjunctiva and sclera clear  ENMT: Moist oral mucosa,   RESPIRATORY: Normal respiratory effort; lungs are clear to auscultation bilaterally  CARDIOVASCULAR: Regular rate and rhythm, normal S1 and S2, no murmur   EXTS: No lower extremity edema; Peripheral pulses are 2+ bilaterally  ABDOMEN: Nontender to palpation, normoactive bowel sounds, no rebound/guarding;   MUSCLOSKELETAL:   no clubbing or cyanosis of digits; no joint swelling or tenderness to palpation  PSYCH: affect appropriate  NEUROLOGY: A+O to person, place, and time; CN 2-12 are intact and symmetric; no gross sensory deficits;       LABS:                        13.3   10.09 )-----------( 298      ( 17 Jul 2022 06:06 )             38.0     07-17    124<L>  |  86<L>  |  56.7<H>  ----------------------------<  123<H>  4.1   |  28.0  |  0.66    Ca    8.7      17 Jul 2022 07:16                  RADIOLOGY & ADDITIONAL TESTS:  Results Reviewed:   Imaging Personally Reviewed:  Electrocardiogram Personally Reviewed:    COORDINATION OF CARE:  Care Discussed with Consultants/Other Providers [Y/N]:  Prior or Outpatient Records Reviewed [Y/N]:

## 2022-07-18 ENCOUNTER — TRANSCRIPTION ENCOUNTER (OUTPATIENT)
Age: 87
End: 2022-07-18

## 2022-07-18 VITALS
SYSTOLIC BLOOD PRESSURE: 132 MMHG | HEART RATE: 88 BPM | DIASTOLIC BLOOD PRESSURE: 70 MMHG | OXYGEN SATURATION: 95 % | TEMPERATURE: 98 F | RESPIRATION RATE: 18 BRPM

## 2022-07-18 LAB
ANION GAP SERPL CALC-SCNC: 6 MMOL/L — SIGNIFICANT CHANGE UP (ref 5–17)
BUN SERPL-MCNC: 69.7 MG/DL — HIGH (ref 8–20)
CALCIUM SERPL-MCNC: 8.9 MG/DL — SIGNIFICANT CHANGE UP (ref 8.6–10.2)
CHLORIDE SERPL-SCNC: 91 MMOL/L — LOW (ref 98–107)
CO2 SERPL-SCNC: 34 MMOL/L — HIGH (ref 22–29)
CREAT SERPL-MCNC: 0.78 MG/DL — SIGNIFICANT CHANGE UP (ref 0.5–1.3)
EGFR: 73 ML/MIN/1.73M2 — SIGNIFICANT CHANGE UP
GLUCOSE SERPL-MCNC: 112 MG/DL — HIGH (ref 70–99)
HCT VFR BLD CALC: 32.9 % — LOW (ref 34.5–45)
HGB BLD-MCNC: 11.7 G/DL — SIGNIFICANT CHANGE UP (ref 11.5–15.5)
MCHC RBC-ENTMCNC: 32.9 PG — SIGNIFICANT CHANGE UP (ref 27–34)
MCHC RBC-ENTMCNC: 35.6 GM/DL — SIGNIFICANT CHANGE UP (ref 32–36)
MCV RBC AUTO: 92.4 FL — SIGNIFICANT CHANGE UP (ref 80–100)
PLATELET # BLD AUTO: 274 K/UL — SIGNIFICANT CHANGE UP (ref 150–400)
POTASSIUM SERPL-MCNC: 5 MMOL/L — SIGNIFICANT CHANGE UP (ref 3.5–5.3)
POTASSIUM SERPL-SCNC: 5 MMOL/L — SIGNIFICANT CHANGE UP (ref 3.5–5.3)
RBC # BLD: 3.56 M/UL — LOW (ref 3.8–5.2)
RBC # FLD: 12.3 % — SIGNIFICANT CHANGE UP (ref 10.3–14.5)
SODIUM SERPL-SCNC: 131 MMOL/L — LOW (ref 135–145)
WBC # BLD: 5.71 K/UL — SIGNIFICANT CHANGE UP (ref 3.8–10.5)
WBC # FLD AUTO: 5.71 K/UL — SIGNIFICANT CHANGE UP (ref 3.8–10.5)

## 2022-07-18 PROCEDURE — 83935 ASSAY OF URINE OSMOLALITY: CPT

## 2022-07-18 PROCEDURE — 85610 PROTHROMBIN TIME: CPT

## 2022-07-18 PROCEDURE — C2617: CPT

## 2022-07-18 PROCEDURE — 99239 HOSP IP/OBS DSCHRG MGMT >30: CPT

## 2022-07-18 PROCEDURE — C1769: CPT

## 2022-07-18 PROCEDURE — 72128 CT CHEST SPINE W/O DYE: CPT | Mod: MA

## 2022-07-18 PROCEDURE — 83735 ASSAY OF MAGNESIUM: CPT

## 2022-07-18 PROCEDURE — 81001 URINALYSIS AUTO W/SCOPE: CPT

## 2022-07-18 PROCEDURE — 84300 ASSAY OF URINE SODIUM: CPT

## 2022-07-18 PROCEDURE — 96375 TX/PRO/DX INJ NEW DRUG ADDON: CPT

## 2022-07-18 PROCEDURE — 85730 THROMBOPLASTIN TIME PARTIAL: CPT

## 2022-07-18 PROCEDURE — 99285 EMERGENCY DEPT VISIT HI MDM: CPT

## 2022-07-18 PROCEDURE — 97163 PT EVAL HIGH COMPLEX 45 MIN: CPT

## 2022-07-18 PROCEDURE — 74019 RADEX ABDOMEN 2 VIEWS: CPT

## 2022-07-18 PROCEDURE — 36415 COLL VENOUS BLD VENIPUNCTURE: CPT

## 2022-07-18 PROCEDURE — 86900 BLOOD TYPING SEROLOGIC ABO: CPT

## 2022-07-18 PROCEDURE — U0003: CPT

## 2022-07-18 PROCEDURE — 74177 CT ABD & PELVIS W/CONTRAST: CPT | Mod: MA

## 2022-07-18 PROCEDURE — 86850 RBC ANTIBODY SCREEN: CPT

## 2022-07-18 PROCEDURE — 80053 COMPREHEN METABOLIC PANEL: CPT

## 2022-07-18 PROCEDURE — U0005: CPT

## 2022-07-18 PROCEDURE — 96374 THER/PROPH/DIAG INJ IV PUSH: CPT

## 2022-07-18 PROCEDURE — 76000 FLUOROSCOPY <1 HR PHYS/QHP: CPT

## 2022-07-18 PROCEDURE — 85027 COMPLETE CBC AUTOMATED: CPT

## 2022-07-18 PROCEDURE — 87086 URINE CULTURE/COLONY COUNT: CPT

## 2022-07-18 PROCEDURE — 80048 BASIC METABOLIC PNL TOTAL CA: CPT

## 2022-07-18 PROCEDURE — 84100 ASSAY OF PHOSPHORUS: CPT

## 2022-07-18 PROCEDURE — 85025 COMPLETE CBC W/AUTO DIFF WBC: CPT

## 2022-07-18 PROCEDURE — 86901 BLOOD TYPING SEROLOGIC RH(D): CPT

## 2022-07-18 RX ORDER — SODIUM BICARBONATE 1 MEQ/ML
1 SYRINGE (ML) INTRAVENOUS
Qty: 90 | Refills: 0
Start: 2022-07-18 | End: 2022-08-16

## 2022-07-18 RX ORDER — SENNA PLUS 8.6 MG/1
2 TABLET ORAL
Qty: 60 | Refills: 0
Start: 2022-07-18 | End: 2022-08-16

## 2022-07-18 RX ORDER — LISINOPRIL 2.5 MG/1
1 TABLET ORAL
Qty: 30 | Refills: 0
Start: 2022-07-18 | End: 2022-08-16

## 2022-07-18 RX ORDER — CEPHALEXIN 500 MG
1 CAPSULE ORAL
Qty: 20 | Refills: 0
Start: 2022-07-18 | End: 2022-07-22

## 2022-07-18 RX ORDER — AMLODIPINE BESYLATE 2.5 MG/1
1 TABLET ORAL
Qty: 30 | Refills: 0
Start: 2022-07-18 | End: 2022-08-16

## 2022-07-18 RX ORDER — ACETAMINOPHEN 500 MG
2 TABLET ORAL
Qty: 40 | Refills: 0
Start: 2022-07-18 | End: 2022-07-22

## 2022-07-18 RX ORDER — TAMSULOSIN HYDROCHLORIDE 0.4 MG/1
1 CAPSULE ORAL
Qty: 30 | Refills: 0
Start: 2022-07-18 | End: 2022-08-16

## 2022-07-18 RX ADMIN — LISINOPRIL 40 MILLIGRAM(S): 2.5 TABLET ORAL at 06:05

## 2022-07-18 RX ADMIN — Medication 250 MILLIGRAM(S): at 06:05

## 2022-07-18 RX ADMIN — Medication 250 MILLIGRAM(S): at 12:23

## 2022-07-18 RX ADMIN — Medication 30 GRAM(S): at 06:06

## 2022-07-18 RX ADMIN — HEPARIN SODIUM 5000 UNIT(S): 5000 INJECTION INTRAVENOUS; SUBCUTANEOUS at 12:24

## 2022-07-18 RX ADMIN — Medication 650 MILLIGRAM(S): at 06:05

## 2022-07-18 RX ADMIN — Medication 650 MILLIGRAM(S): at 14:23

## 2022-07-18 RX ADMIN — AMLODIPINE BESYLATE 5 MILLIGRAM(S): 2.5 TABLET ORAL at 06:06

## 2022-07-18 NOTE — DISCHARGE NOTE PROVIDER - CARE PROVIDERS DIRECT ADDRESSES
[email or web address]

## 2022-07-18 NOTE — DISCHARGE NOTE PROVIDER - NSDCCPCAREPLAN_GEN_ALL_CORE_FT
PRINCIPAL DISCHARGE DIAGNOSIS  Diagnosis: Acute hyponatremia  Assessment and Plan of Treatment:       SECONDARY DISCHARGE DIAGNOSES  Diagnosis: Nephrolithiasis  Assessment and Plan of Treatment:     Diagnosis: Lumbar compression fracture  Assessment and Plan of Treatment:     Diagnosis: Nephrolithiasis  Assessment and Plan of Treatment:     Diagnosis: Hypertension  Assessment and Plan of Treatment:     Diagnosis: Nephrolithiasis  Assessment and Plan of Treatment:     Diagnosis: Acute hyponatremia  Assessment and Plan of Treatment:

## 2022-07-18 NOTE — DISCHARGE NOTE PROVIDER - ATTENDING DISCHARGE PHYSICAL EXAMINATION:
T(C): 36.6 (07-18-22 @ 10:27), Max: 36.7 (07-17-22 @ 22:34)  HR: 91 (07-18-22 @ 10:27) (88 - 93)  BP: 136/68 (07-18-22 @ 10:27) (117/64 - 154/73)  RR: 18 (07-18-22 @ 10:27) (18 - 18)  SpO2: 90% (07-18-22 @ 10:27) (90% - 93%)    GEN - NAD  HEENT - NCAT, EOMI, ASAEL, no JVD/bruit.  RESP - CTA BL, no wheeze/rhonchi/crackles. not on supplemental O2.  CARDIO - NS1S2, RRR. No murmurs  ABD - Soft/Non tender/Non distended. Normal BS x4 quadrants.   Ext - No ZEV.   MSK - full ROM of BL upper and lower extremities without pain or restriction. BL 5/5 strength on upper and lower extremities.   Neuro - cn 2-12 grossly intact. no tremor. gait not observed.   Skin - clean, dry,   Psych- AAOx3.  normal affect. T(C): 36.6 (07-18-22 @ 10:27), Max: 36.7 (07-17-22 @ 22:34)  HR: 91 (07-18-22 @ 10:27) (88 - 93)  BP: 136/68 (07-18-22 @ 10:27) (117/64 - 154/73)  RR: 18 (07-18-22 @ 10:27) (18 - 18)  SpO2: 95%(ambulation)    GEN - NAD  HEENT - NCAT, EOMI, ASAEL, no JVD/bruit.  RESP - CTA BL, no wheeze/rhonchi/crackles. not on supplemental O2.  CARDIO - NS1S2, RRR. No murmurs  ABD - Soft/Non tender/Non distended. Normal BS x4 quadrants.   Ext - No ZEV.   MSK - full ROM of BL upper and lower extremities without pain or restriction. BL 5/5 strength on upper and lower extremities.   Neuro - cn 2-12 grossly intact. no tremor. gait not observed.   Skin - clean, dry,   Psych- AAOx3.  normal affect.

## 2022-07-18 NOTE — DISCHARGE NOTE PROVIDER - NSDCMRMEDTOKEN_GEN_ALL_CORE_FT
amLODIPine 5 mg oral tablet: 1 tab(s) orally once a day  cephalexin 250 mg oral capsule: 1 cap(s) orally every 6 hours  lisinopril 40 mg oral tablet: 1 tab(s) orally once a day  senna leaf extract oral tablet: 2 tab(s) orally once a day (at bedtime)  sodium bicarbonate 650 mg oral tablet: 1 tab(s) orally 3 times a day  tamsulosin 0.4 mg oral capsule: 1 cap(s) orally once a day (at bedtime)  Tylenol 325 mg oral tablet: 2 tab(s) orally every 6 hours, As Needed -for severe pain

## 2022-07-18 NOTE — DISCHARGE NOTE PROVIDER - CARE PROVIDER_API CALL
Miladys Melendez)  Internal Medicine; Nephrology  [address] [place] [address]  Phone: [phone]  Fax: [phone]  Follow Up Time: 1 week    Fran Alanis)  Urology  [address], [address]  [place] [address]  Phone: [phone]  Fax: [phone]  Follow Up Time: 1 week

## 2022-07-18 NOTE — DISCHARGE NOTE PROVIDER - PROVIDER TOKENS
PROVIDER:[TOKEN:[[id number]],FOLLOWUP:[1 week]],PROVIDER:[TOKEN:[[id number]],FOLLOWUP:[1 week]]

## 2022-07-18 NOTE — DISCHARGE NOTE PROVIDER - HOSPITAL COURSE
89 y/o female admitted with lt flnak pain found to have left obstructing renal stone s/p cystoscopy and stent placement, Uncontrolled HTN which is stable now, Chronic back pain from lumbar compression fractures ambulating with PT. PT recommends RW,Home PT. Sodium was low s/p salt tab. stable now. pt was urinary retention s/p reinsert paiz. Per Urology Dr. Phillips cleared to discharge home and f/u one week as out pt. mild lt lower abd discomfort can be from stent per urology.  Constipation, pt has BM,Abdomen soft. 88 y/o female admitted with lt flnak pain found to have left obstructing renal stone s/p cystoscopy and stent placement, Uncontrolled HTN which is stable now, Chronic back pain from lumbar compression fractures ambulating with PT. PT recommends RW,Home PT. Sodium was low s/p salt tab. stable now. pt was urinary retention s/p reinsert paiz. Per Urology Dr. Phillips cleared to discharge home and f/u one week as out pt. mild lt lower abd discomfort can be from stent per urology.  Constipation, pt has BM,Abdomen soft. ambulatory so2 95%. spoke with daughter up date all care of plan and discharge instruction.

## 2022-07-19 PROBLEM — M48.50XA COLLAPSED VERTEBRA, NOT ELSEWHERE CLASSIFIED, SITE UNSPECIFIED, INITIAL ENCOUNTER FOR FRACTURE: Chronic | Status: ACTIVE | Noted: 2022-07-13

## 2022-07-19 PROBLEM — Z00.00 ENCOUNTER FOR PREVENTIVE HEALTH EXAMINATION: Status: ACTIVE | Noted: 2022-07-19

## 2022-07-20 ENCOUNTER — APPOINTMENT (OUTPATIENT)
Dept: UROLOGY | Facility: CLINIC | Age: 87
End: 2022-07-20

## 2022-07-20 VITALS
HEART RATE: 92 BPM | SYSTOLIC BLOOD PRESSURE: 164 MMHG | DIASTOLIC BLOOD PRESSURE: 73 MMHG | WEIGHT: 134 LBS | HEIGHT: 60 IN | BODY MASS INDEX: 26.31 KG/M2

## 2022-07-20 DIAGNOSIS — Z80.0 FAMILY HISTORY OF MALIGNANT NEOPLASM OF DIGESTIVE ORGANS: ICD-10-CM

## 2022-07-20 DIAGNOSIS — Z84.1 FAMILY HISTORY OF DISORDERS OF KIDNEY AND URETER: ICD-10-CM

## 2022-07-20 DIAGNOSIS — R33.9 RETENTION OF URINE, UNSPECIFIED: ICD-10-CM

## 2022-07-20 PROCEDURE — 99214 OFFICE O/P EST MOD 30 MIN: CPT

## 2022-07-20 RX ORDER — PANTOPRAZOLE 40 MG/1
40 TABLET, DELAYED RELEASE ORAL
Qty: 90 | Refills: 0 | Status: ACTIVE | COMMUNITY
Start: 2022-04-23

## 2022-07-20 RX ORDER — PREDNISONE 10 MG/1
10 TABLET ORAL
Qty: 18 | Refills: 0 | Status: DISCONTINUED | COMMUNITY
Start: 2022-07-11

## 2022-07-20 RX ORDER — ESCITALOPRAM OXALATE 20 MG/1
20 TABLET ORAL
Qty: 90 | Refills: 0 | Status: ACTIVE | COMMUNITY
Start: 2022-04-23

## 2022-07-20 RX ORDER — LISINOPRIL 30 MG/1
30 TABLET ORAL
Qty: 90 | Refills: 0 | Status: ACTIVE | COMMUNITY
Start: 2022-04-23

## 2022-07-20 NOTE — ASSESSMENT
[FreeTextEntry1] : Impression:\par \par left upper ureteral stone, 1.4 cm\par urinary retention. \par \par Plan:\par left ureteroscopy with laser\par I have discussed the risks, benefits and alternatives of ureteroscopy with the patient. These include, but are not limited to: infection, bleeding, urethral injury, bladder injury, ureteral injury including stricture which may be delayed, proximal propagation of stone, inability to fragment or completely fragment the stone.  The patient understands that a ureteral stent may need to be placed and that this decision usually is made at the time of the procedure.  The possibility of conversion to a different procedure may also be necessary and the patient understands this as well.  Infection may be relatively minor or severe, even life-threatening sepsis. I have explained the potential side effects of ureteral stenting which are usually urinary frequency, pain and hematuria. If a ureteral stent was needed the patient knows it may be in place short-term, in which case the suture will remain attached as manufactured, or the suture will be removed to prevent inadvertent stent removal. \par Also, because the procedure will require general anesthesia, risks inherent to general anesthesia such as heart attack, irregular heartbeat, pneumonia, or even death may occur. It is understood these risks are highly unlikely but not zero. \par The patient’s questions were answered.\par \par COVID test\par post op voiding trial.

## 2022-07-20 NOTE — LETTER BODY
[Dear  ___] : Dear  [unfilled], [Courtesy Letter:] : I had the pleasure of seeing your patient, [unfilled], in my office today. [Please see my note below.] : Please see my note below. [Sincerely,] : Sincerely, [FreeTextEntry3] : Ed\par \par Fran Alanis MD\par [facility]\par  of Urology\par [employer] at [facility]\par

## 2022-07-20 NOTE — HISTORY OF PRESENT ILLNESS
[FreeTextEntry1] : The patient has been stented for sepsis and 1.4 cm left upper ureteral stone. Also in urinary retention. no flank pain or hematuria.  no fevers or chills.  no

## 2022-07-20 NOTE — PHYSICAL EXAM
[General Appearance - Well Developed] : well developed [General Appearance - Well Nourished] : well nourished [Normal Appearance] : normal appearance [Well Groomed] : well groomed [General Appearance - In No Acute Distress] : no acute distress [Edema] : no peripheral edema [Respiration, Rhythm And Depth] : normal respiratory rhythm and effort [Exaggerated Use Of Accessory Muscles For Inspiration] : no accessory muscle use [] : no rash [Oriented To Time, Place, And Person] : oriented to person, place, and time [Affect] : the affect was normal [Mood] : the mood was normal [Not Anxious] : not anxious [FreeTextEntry1] : slightly unsteady gait

## 2022-07-28 ENCOUNTER — OUTPATIENT (OUTPATIENT)
Dept: OUTPATIENT SERVICES | Facility: HOSPITAL | Age: 87
LOS: 1 days | End: 2022-07-28
Payer: MEDICARE

## 2022-07-28 VITALS
DIASTOLIC BLOOD PRESSURE: 70 MMHG | RESPIRATION RATE: 17 BRPM | HEART RATE: 92 BPM | HEIGHT: 63 IN | WEIGHT: 128.97 LBS | TEMPERATURE: 97 F | OXYGEN SATURATION: 95 % | SYSTOLIC BLOOD PRESSURE: 120 MMHG

## 2022-07-28 DIAGNOSIS — N20.0 CALCULUS OF KIDNEY: ICD-10-CM

## 2022-07-28 DIAGNOSIS — Z98.890 OTHER SPECIFIED POSTPROCEDURAL STATES: Chronic | ICD-10-CM

## 2022-07-28 DIAGNOSIS — Z96.0 PRESENCE OF UROGENITAL IMPLANTS: Chronic | ICD-10-CM

## 2022-07-28 DIAGNOSIS — Z01.818 ENCOUNTER FOR OTHER PREPROCEDURAL EXAMINATION: ICD-10-CM

## 2022-07-28 DIAGNOSIS — Z90.49 ACQUIRED ABSENCE OF OTHER SPECIFIED PARTS OF DIGESTIVE TRACT: Chronic | ICD-10-CM

## 2022-07-28 DIAGNOSIS — Z91.89 OTHER SPECIFIED PERSONAL RISK FACTORS, NOT ELSEWHERE CLASSIFIED: ICD-10-CM

## 2022-07-28 DIAGNOSIS — I10 ESSENTIAL (PRIMARY) HYPERTENSION: ICD-10-CM

## 2022-07-28 LAB
ANION GAP SERPL CALC-SCNC: 8 MMOL/L — SIGNIFICANT CHANGE UP (ref 5–17)
APTT BLD: 25.9 SEC — LOW (ref 27.5–35.5)
BASOPHILS # BLD AUTO: 0.04 K/UL — SIGNIFICANT CHANGE UP (ref 0–0.2)
BASOPHILS NFR BLD AUTO: 0.4 % — SIGNIFICANT CHANGE UP (ref 0–2)
BUN SERPL-MCNC: 17.8 MG/DL — SIGNIFICANT CHANGE UP (ref 8–20)
CALCIUM SERPL-MCNC: 9.4 MG/DL — SIGNIFICANT CHANGE UP (ref 8.4–10.5)
CHLORIDE SERPL-SCNC: 96 MMOL/L — LOW (ref 98–107)
CO2 SERPL-SCNC: 30 MMOL/L — HIGH (ref 22–29)
CREAT SERPL-MCNC: 0.87 MG/DL — SIGNIFICANT CHANGE UP (ref 0.5–1.3)
EGFR: 64 ML/MIN/1.73M2 — SIGNIFICANT CHANGE UP
EOSINOPHIL # BLD AUTO: 0.34 K/UL — SIGNIFICANT CHANGE UP (ref 0–0.5)
EOSINOPHIL NFR BLD AUTO: 3.4 % — SIGNIFICANT CHANGE UP (ref 0–6)
GLUCOSE SERPL-MCNC: 114 MG/DL — HIGH (ref 70–99)
HCT VFR BLD CALC: 39.1 % — SIGNIFICANT CHANGE UP (ref 34.5–45)
HGB BLD-MCNC: 13 G/DL — SIGNIFICANT CHANGE UP (ref 11.5–15.5)
IMM GRANULOCYTES NFR BLD AUTO: 0.3 % — SIGNIFICANT CHANGE UP (ref 0–1.5)
INR BLD: 0.97 RATIO — SIGNIFICANT CHANGE UP (ref 0.88–1.16)
LYMPHOCYTES # BLD AUTO: 1.65 K/UL — SIGNIFICANT CHANGE UP (ref 1–3.3)
LYMPHOCYTES # BLD AUTO: 16.6 % — SIGNIFICANT CHANGE UP (ref 13–44)
MCHC RBC-ENTMCNC: 31.9 PG — SIGNIFICANT CHANGE UP (ref 27–34)
MCHC RBC-ENTMCNC: 33.2 GM/DL — SIGNIFICANT CHANGE UP (ref 32–36)
MCV RBC AUTO: 95.8 FL — SIGNIFICANT CHANGE UP (ref 80–100)
MONOCYTES # BLD AUTO: 1.08 K/UL — HIGH (ref 0–0.9)
MONOCYTES NFR BLD AUTO: 10.9 % — SIGNIFICANT CHANGE UP (ref 2–14)
NEUTROPHILS # BLD AUTO: 6.8 K/UL — SIGNIFICANT CHANGE UP (ref 1.8–7.4)
NEUTROPHILS NFR BLD AUTO: 68.4 % — SIGNIFICANT CHANGE UP (ref 43–77)
PLATELET # BLD AUTO: 414 K/UL — HIGH (ref 150–400)
POTASSIUM SERPL-MCNC: 5 MMOL/L — SIGNIFICANT CHANGE UP (ref 3.5–5.3)
POTASSIUM SERPL-SCNC: 5 MMOL/L — SIGNIFICANT CHANGE UP (ref 3.5–5.3)
PROTHROM AB SERPL-ACNC: 11.3 SEC — SIGNIFICANT CHANGE UP (ref 10.5–13.4)
RBC # BLD: 4.08 M/UL — SIGNIFICANT CHANGE UP (ref 3.8–5.2)
RBC # FLD: 12.5 % — SIGNIFICANT CHANGE UP (ref 10.3–14.5)
SARS-COV-2 RNA SPEC QL NAA+PROBE: SIGNIFICANT CHANGE UP
SODIUM SERPL-SCNC: 134 MMOL/L — LOW (ref 135–145)
WBC # BLD: 9.94 K/UL — SIGNIFICANT CHANGE UP (ref 3.8–10.5)
WBC # FLD AUTO: 9.94 K/UL — SIGNIFICANT CHANGE UP (ref 3.8–10.5)

## 2022-07-28 PROCEDURE — 80048 BASIC METABOLIC PNL TOTAL CA: CPT

## 2022-07-28 PROCEDURE — 85025 COMPLETE CBC W/AUTO DIFF WBC: CPT

## 2022-07-28 PROCEDURE — 93005 ELECTROCARDIOGRAM TRACING: CPT

## 2022-07-28 PROCEDURE — U0005: CPT

## 2022-07-28 PROCEDURE — 85730 THROMBOPLASTIN TIME PARTIAL: CPT

## 2022-07-28 PROCEDURE — U0003: CPT

## 2022-07-28 PROCEDURE — 36415 COLL VENOUS BLD VENIPUNCTURE: CPT

## 2022-07-28 PROCEDURE — 85610 PROTHROMBIN TIME: CPT

## 2022-07-28 PROCEDURE — 93010 ELECTROCARDIOGRAM REPORT: CPT

## 2022-07-28 PROCEDURE — G0463: CPT

## 2022-07-28 NOTE — H&P PST ADULT - HISTORY OF PRESENT ILLNESS
89 y/o female with PMH of HTN, GERD and anxiety/depression presents to PST with complaints of having a kidney stone. She was recently hospitalized at [facility] 7/12/22 with having been found to have a kidney stone and urinary retention. She states she was having severe right flank and side pain. She had a CT scan done at the hospital which found a large left kidney stone. Denies fevers, chills, vomiting or blood in urine. She reports some associated nausea. She has a paiz cathter in place that was put in while she was in the hospital. Patient is scheduled for left ureterscopy with laser lithotripsy, possible placement of left ureteral stent with Dr. Alanis on 8/2/22.

## 2022-07-28 NOTE — H&P PST ADULT - GASTROINTESTINAL
details… soft/nondistended/normal active bowel sounds/no guarding/no rigidity/no masses palpable/tender

## 2022-07-28 NOTE — H&P PST ADULT - NSICDXPASTMEDICALHX_GEN_ALL_CORE_FT
PAST MEDICAL HISTORY:  Anxiety and depression     Compression fracture of spine     GERD (gastroesophageal reflux disease)     HTN (hypertension)     Kidney stone

## 2022-07-28 NOTE — H&P PST ADULT - PROBLEM SELECTOR PLAN 1
medical clearance pending  Patient is scheduled for left ureteroscopy with laser lithotripsy, possible placement of left ureteral stent with Dr. Alanis on 8/2/22.

## 2022-07-28 NOTE — H&P PST ADULT - NSICDXFAMILYHX_GEN_ALL_CORE_FT
FAMILY HISTORY:  Mother  Still living? Unknown  FH: HTN (hypertension), Age at diagnosis: Age Unknown    Sibling  Still living? Unknown  FH: aortic aneurysm, Age at diagnosis: Age Unknown  FH: colon cancer, Age at diagnosis: Age Unknown

## 2022-07-28 NOTE — H&P PST ADULT - GENERAL GENITOURINARY SYMPTOMS
Normal function and healed incision.  Continue current programming with decreased output.    renal colic/flank pain L/flank pain R

## 2022-07-28 NOTE — H&P PST ADULT - LAB RESULTS AND INTERPRETATION
labs pending labs pending    Spoke with Archana DEL CID at Dr. Alanis, no urine sample needed at this time as UA/ urine culture done on 7/13/22.

## 2022-07-28 NOTE — H&P PST ADULT - MUSCULOSKELETAL
details… normal/ROM intact/no calf tenderness/normal gait/strength 5/5 bilateral upper extremities/strength 5/5 bilateral lower extremities

## 2022-07-28 NOTE — H&P PST ADULT - ASSESSMENT
Patient educated on surgical COVID testing done at [facility], preadmission instructions, medical clearance and day of procedure medications, verbalizes understanding. Pt instructed to stop vitamins/supplements/herbal medications/ASA/NSAIDS for one week prior to surgery and discuss with PMD.    CAPRINI SCORE    AGE RELATED RISK FACTORS                                                             [ ] Age 41-60 years                                            (1 Point)  [ ] Age: 61-74 years                                           (2 Points)                 [ ] Age= 75 years                                                (3 Points)             DISEASE RELATED RISK FACTORS                                                       [ ] Edema in the lower extremities                 (1 Point)                     [ ] Varicose veins                                               (1 Point)                                 [ ] BMI > 25 Kg/m2                                            (1 Point)                                  [ ] Serious infection (ie PNA)                            (1 Point)                     [ ] Lung disease ( COPD, Emphysema)            (1 Point)                                                                          [ ] Acute myocardial infarction                         (1 Point)                  [ ] Congestive heart failure (in the previous month)  (1 Point)         [ ] Inflammatory bowel disease                            (1 Point)                  [ ] Central venous access, PICC or Port               (2 points)       (within the last month)                                                                [ ] Stroke (in the previous month)                        (5 Points)    [ ] Previous or present malignancy                       (2 points)                                                                                                                                                         HEMATOLOGY RELATED FACTORS                                                         [ ] Prior episodes of VTE                                     (3 Points)                     [ ] Positive family history for VTE                      (3 Points)                  [ ] Prothrombin 54896 A                                     (3 Points)                     [ ] Factor V Leiden                                                (3 Points)                        [ ] Lupus anticoagulants                                      (3 Points)                                                           [ ] Anticardiolipin antibodies                              (3 Points)                                                       [ ] High homocysteine in the blood                   (3 Points)                                             [ ] Other congenital or acquired thrombophilia      (3 Points)                                                [ ] Heparin induced thrombocytopenia                  (3 Points)                                        MOBILITY RELATED FACTORS  [ ] Bed rest                                                         (1 Point)  [ ] Plaster cast                                                    (2 points)  [ ] Bed bound for more than 72 hours           (2 Points)    GENDER SPECIFIC FACTORS  [ ] Pregnancy or had a baby within the last month   (1 Point)  [ ] Post-partum < 6 weeks                                   (1 Point)  [ ] Hormonal therapy  or oral contraception   (1 Point)  [ ] History of pregnancy complications              (1 point)  [ ] Unexplained or recurrent              (1 Point)    OTHER RISK FACTORS                                           (1 Point)  [ ] BMI >40, smoking, diabetes requiring insulin, chemotherapy  blood transfusions and length of surgery over 2 hours    SURGERY RELATED RISK FACTORS  [ ]  Section within the last month     (1 Point)  [ ] Minor surgery                                                  (1 Point)  [ ] Arthroscopic surgery                                       (2 Points)  [ ] Planned major surgery lasting more            (2 Points)      than 45 minutes     [ ] Elective hip or knee joint replacement       (5 points)       surgery                                                TRAUMA RELATED RISK FACTORS  [ ] Fracture of the hip, pelvis, or leg                       (5 Points)  [ ] Spinal cord injury resulting in paralysis             (5 points)       (in the previous month)    [ ] Paralysis  (less than 1 month)                             (5 Points)  [ ] Multiple Trauma within 1 month                        (5 Points)    Total Score [        ]    Caprini Score 0-2: Low Risk, NO VTE prophylaxis required for most patients, encourage ambulation  Caprini Score 3-6: Moderate Risk , pharmacologic VTE prophylaxis is indicated for most patients (in the absence of contraindications)  Caprini Score Greater than or =7: High risk, pharmocologic VTE prophylaxis indicated for most patients (in the absence of contraindications)    OPIOID RISK TOOL    DAVID EACH BOX THAT APPLIES AND ADD TOTALS AT THE END    FAMILY HISTORY OF SUBSTANCE ABUSE                 FEMALE         MALE                                                Alcohol                             [  ]1 pt          [  ]3pts                                               Illegal Durgs                     [  ]2 pts        [  ]3pts                                               Rx Drugs                           [  ]4 pts        [  ]4 pts    PERSONAL HISTORY OF SUBSTANCE ABUSE                                                                                          Alcohol                             [  ]3 pts       [  ]3 pts                                               Illegal Drugs                     [  ]4 pts        [  ]4 pts                                               Rx Drugs                           [  ]5 pts        [  ]5 pts    AGE BETWEEN 16-45 YEARS                                      [  ]1 pt         [  ]1 pt    HISTORY OF PREADOLESCENT   SEXUAL ABUSE                                                             [  ]3 pts        [  ]0pts    PSYCHOLOGICAL DISEASE                     ADD, OCD, Bipolar, Schizophrenia        [  ]2 pts         [  ]2 pts                      Depression                                               [  ]1 pt           [  ]1 pt           SCORING TOTAL   (add numbers and type here)              (***)                                     A score of 3 or lower indicated LOW risk for future opioid abuse  A score of 4 to 7 indicated moderate risk for future opioid abuse  A score of 8 or higher indicates a high risk for opioid abuse   88 y/o female with PMH of HTN, GERD and anxiety/depression presents to PST with complaints of having a kidney stone. She was recently hospitalized at [facility] 22 with having been found to have a kidney stone and urinary retention. She states she was having severe right flank and side pain. She had a CT scan done at the hospital which found a large left kidney stone. Denies fevers, chills, vomiting or blood in urine. She reports some associated nausea. She has a Jernigan catheter in place that was put in while she was in the hospital. Patient is scheduled for left ureteroscopy with laser lithotripsy, possible placement of left ureteral stent with Dr. Alanis on 22. Patient educated on surgical COVID testing done at [facility], preadmission instructions, medical clearance and day of procedure medications, verbalizes understanding. Pt instructed to stop vitamins/supplements/herbal medications/ASA/NSAIDS for one week prior to surgery and discuss with PMD.    CAPRINI SCORE    AGE RELATED RISK FACTORS                                                             [ ] Age 41-60 years                                            (1 Point)  [ ] Age: 61-74 years                                           (2 Points)                 [x ] Age= 75 years                                                (3 Points)             DISEASE RELATED RISK FACTORS                                                       [ ] Edema in the lower extremities                 (1 Point)                     [ ] Varicose veins                                               (1 Point)                                 [ ] BMI > 25 Kg/m2                                            (1 Point)                                  [ ] Serious infection (ie PNA)                            (1 Point)                     [ ] Lung disease ( COPD, Emphysema)            (1 Point)                                                                          [ ] Acute myocardial infarction                         (1 Point)                  [ ] Congestive heart failure (in the previous month)  (1 Point)         [ ] Inflammatory bowel disease                            (1 Point)                  [ ] Central venous access, PICC or Port               (2 points)       (within the last month)                                                                [ ] Stroke (in the previous month)                        (5 Points)    [ ] Previous or present malignancy                       (2 points)                                                                                                                                                         HEMATOLOGY RELATED FACTORS                                                         [ ] Prior episodes of VTE                                     (3 Points)                     [ ] Positive family history for VTE                      (3 Points)                  [ ] Prothrombin 12033 A                                     (3 Points)                     [ ] Factor V Leiden                                                (3 Points)                        [ ] Lupus anticoagulants                                      (3 Points)                                                           [ ] Anticardiolipin antibodies                              (3 Points)                                                       [ ] High homocysteine in the blood                   (3 Points)                                             [ ] Other congenital or acquired thrombophilia      (3 Points)                                                [ ] Heparin induced thrombocytopenia                  (3 Points)                                        MOBILITY RELATED FACTORS  [ ] Bed rest                                                         (1 Point)  [ ] Plaster cast                                                    (2 points)  [ ] Bed bound for more than 72 hours           (2 Points)    GENDER SPECIFIC FACTORS  [ ] Pregnancy or had a baby within the last month   (1 Point)  [ ] Post-partum < 6 weeks                                   (1 Point)  [ ] Hormonal therapy  or oral contraception   (1 Point)  [ ] History of pregnancy complications              (1 point)  [ ] Unexplained or recurrent              (1 Point)    OTHER RISK FACTORS                                           (1 Point)  [ ] BMI >40, smoking, diabetes requiring insulin, chemotherapy  blood transfusions and length of surgery over 2 hours    SURGERY RELATED RISK FACTORS  [ ]  Section within the last month     (1 Point)  [ x] Minor surgery                                                  (1 Point)  [ ] Arthroscopic surgery                                       (2 Points)  [ ] Planned major surgery lasting more            (2 Points)      than 45 minutes     [ ] Elective hip or knee joint replacement       (5 points)       surgery                                                TRAUMA RELATED RISK FACTORS  [ ] Fracture of the hip, pelvis, or leg                       (5 Points)  [ ] Spinal cord injury resulting in paralysis             (5 points)       (in the previous month)    [ ] Paralysis  (less than 1 month)                             (5 Points)  [ ] Multiple Trauma within 1 month                        (5 Points)    Total Score [  4      ]    Caprini Score 0-2: Low Risk, NO VTE prophylaxis required for most patients, encourage ambulation  Caprini Score 3-6: Moderate Risk , pharmacologic VTE prophylaxis is indicated for most patients (in the absence of contraindications)  Caprini Score Greater than or =7: High risk, pharmocologic VTE prophylaxis indicated for most patients (in the absence of contraindications)    OPIOID RISK TOOL    DAVID EACH BOX THAT APPLIES AND ADD TOTALS AT THE END    FAMILY HISTORY OF SUBSTANCE ABUSE                 FEMALE         MALE                                                Alcohol                             [  ]1 pt          [  ]3pts                                               Illegal Durgs                     [  ]2 pts        [  ]3pts                                               Rx Drugs                           [  ]4 pts        [  ]4 pts    PERSONAL HISTORY OF SUBSTANCE ABUSE                                                                                          Alcohol                             [  ]3 pts       [  ]3 pts                                               Illegal Drugs                     [  ]4 pts        [  ]4 pts                                               Rx Drugs                           [  ]5 pts        [  ]5 pts    AGE BETWEEN 16-45 YEARS                                      [  ]1 pt         [  ]1 pt    HISTORY OF PREADOLESCENT   SEXUAL ABUSE                                                             [  ]3 pts        [  ]0pts    PSYCHOLOGICAL DISEASE                     ADD, OCD, Bipolar, Schizophrenia        [  ]2 pts         [  ]2 pts                      Depression                                               [ x ]1 pt           [  ]1 pt           SCORING TOTAL   (add numbers and type here)              (*1**)                                     A score of 3 or lower indicated LOW risk for future opioid abuse  A score of 4 to 7 indicated moderate risk for future opioid abuse  A score of 8 or higher indicates a high risk for opioid abuse

## 2022-07-28 NOTE — H&P PST ADULT - NSICDXPASTSURGICALHX_GEN_ALL_CORE_FT
PAST SURGICAL HISTORY:  H/O arthroscopy of left knee     History of cholecystectomy     S/P ureteral stent placement

## 2022-08-01 ENCOUNTER — TRANSCRIPTION ENCOUNTER (OUTPATIENT)
Age: 87
End: 2022-08-01

## 2022-08-01 PROBLEM — N20.0 CALCULUS OF KIDNEY: Chronic | Status: ACTIVE | Noted: 2022-07-28

## 2022-08-01 PROBLEM — F41.9 ANXIETY DISORDER, UNSPECIFIED: Chronic | Status: ACTIVE | Noted: 2022-07-28

## 2022-08-01 PROBLEM — I10 ESSENTIAL (PRIMARY) HYPERTENSION: Chronic | Status: ACTIVE | Noted: 2022-07-28

## 2022-08-01 PROBLEM — K21.9 GASTRO-ESOPHAGEAL REFLUX DISEASE WITHOUT ESOPHAGITIS: Chronic | Status: ACTIVE | Noted: 2022-07-28

## 2022-08-02 ENCOUNTER — OUTPATIENT (OUTPATIENT)
Dept: INPATIENT UNIT | Facility: HOSPITAL | Age: 87
LOS: 1 days | End: 2022-08-02
Payer: MEDICARE

## 2022-08-02 ENCOUNTER — TRANSCRIPTION ENCOUNTER (OUTPATIENT)
Age: 87
End: 2022-08-02

## 2022-08-02 ENCOUNTER — APPOINTMENT (OUTPATIENT)
Dept: UROLOGY | Facility: HOSPITAL | Age: 87
End: 2022-08-02

## 2022-08-02 ENCOUNTER — RESULT REVIEW (OUTPATIENT)
Age: 87
End: 2022-08-02

## 2022-08-02 VITALS
RESPIRATION RATE: 18 BRPM | HEART RATE: 88 BPM | DIASTOLIC BLOOD PRESSURE: 62 MMHG | OXYGEN SATURATION: 98 % | SYSTOLIC BLOOD PRESSURE: 147 MMHG

## 2022-08-02 VITALS — WEIGHT: 128.97 LBS | HEIGHT: 63 IN

## 2022-08-02 DIAGNOSIS — Z96.0 PRESENCE OF UROGENITAL IMPLANTS: Chronic | ICD-10-CM

## 2022-08-02 DIAGNOSIS — Z90.49 ACQUIRED ABSENCE OF OTHER SPECIFIED PARTS OF DIGESTIVE TRACT: Chronic | ICD-10-CM

## 2022-08-02 DIAGNOSIS — N20.1 CALCULUS OF URETER: ICD-10-CM

## 2022-08-02 DIAGNOSIS — Z98.890 OTHER SPECIFIED POSTPROCEDURAL STATES: Chronic | ICD-10-CM

## 2022-08-02 PROCEDURE — 74420 UROGRAPHY RTRGR +-KUB: CPT | Mod: 26,LT

## 2022-08-02 PROCEDURE — 88300 SURGICAL PATH GROSS: CPT | Mod: 26

## 2022-08-02 PROCEDURE — 52356 CYSTO/URETERO W/LITHOTRIPSY: CPT | Mod: LT

## 2022-08-02 DEVICE — URETERAL SHEATH NAVIGATOR HD 12/14FR X 28CM: Type: IMPLANTABLE DEVICE | Status: FUNCTIONAL

## 2022-08-02 DEVICE — LASER FIBER FLEXIVA 365: Type: IMPLANTABLE DEVICE | Status: FUNCTIONAL

## 2022-08-02 DEVICE — GWIRE NITINOL STRT TIP .035IN 150CM: Type: IMPLANTABLE DEVICE | Status: FUNCTIONAL

## 2022-08-02 DEVICE — LASER FIBER FLEXIVA TRACTIP 200: Type: IMPLANTABLE DEVICE | Status: FUNCTIONAL

## 2022-08-02 DEVICE — BASKET ZEROTIP NITINOL 1.9FR 120CM X 12MM 4 WIRES: Type: IMPLANTABLE DEVICE | Status: FUNCTIONAL

## 2022-08-02 DEVICE — LASER FIBER FLEXIVA 550: Type: IMPLANTABLE DEVICE | Status: FUNCTIONAL

## 2022-08-02 DEVICE — CATH URET AXCESS 6FR 70CM: Type: IMPLANTABLE DEVICE | Status: FUNCTIONAL

## 2022-08-02 DEVICE — STENT URET DBL J 6X24 W/O GDWIRE: Type: IMPLANTABLE DEVICE | Status: FUNCTIONAL

## 2022-08-02 RX ORDER — ONDANSETRON 8 MG/1
4 TABLET, FILM COATED ORAL ONCE
Refills: 0 | Status: DISCONTINUED | OUTPATIENT
Start: 2022-08-02 | End: 2022-08-02

## 2022-08-02 RX ORDER — SODIUM CHLORIDE 9 MG/ML
1000 INJECTION, SOLUTION INTRAVENOUS
Refills: 0 | Status: DISCONTINUED | OUTPATIENT
Start: 2022-08-02 | End: 2022-08-02

## 2022-08-02 RX ORDER — ESCITALOPRAM OXALATE 10 MG/1
1 TABLET, FILM COATED ORAL
Qty: 0 | Refills: 0 | DISCHARGE

## 2022-08-02 RX ORDER — KETOROLAC TROMETHAMINE 30 MG/ML
30 SYRINGE (ML) INJECTION ONCE
Refills: 0 | Status: DISCONTINUED | OUTPATIENT
Start: 2022-08-02 | End: 2022-08-02

## 2022-08-02 RX ORDER — FENTANYL CITRATE 50 UG/ML
25 INJECTION INTRAVENOUS
Refills: 0 | Status: DISCONTINUED | OUTPATIENT
Start: 2022-08-02 | End: 2022-08-02

## 2022-08-02 RX ORDER — CEPHALEXIN 500 MG
1 CAPSULE ORAL
Qty: 10 | Refills: 0
Start: 2022-08-02

## 2022-08-02 RX ORDER — SODIUM CHLORIDE 9 MG/ML
3 INJECTION INTRAMUSCULAR; INTRAVENOUS; SUBCUTANEOUS ONCE
Refills: 0 | Status: DISCONTINUED | OUTPATIENT
Start: 2022-08-02 | End: 2022-08-02

## 2022-08-02 RX ORDER — ACETAMINOPHEN 500 MG
975 TABLET ORAL ONCE
Refills: 0 | Status: COMPLETED | OUTPATIENT
Start: 2022-08-02 | End: 2022-08-02

## 2022-08-02 RX ORDER — CHOLECALCIFEROL (VITAMIN D3) 125 MCG
1 CAPSULE ORAL
Qty: 0 | Refills: 0 | DISCHARGE

## 2022-08-02 RX ORDER — CEFAZOLIN SODIUM 1 G
2000 VIAL (EA) INJECTION ONCE
Refills: 0 | Status: COMPLETED | OUTPATIENT
Start: 2022-08-02 | End: 2022-08-02

## 2022-08-02 RX ORDER — ACETAMINOPHEN 500 MG
1000 TABLET ORAL ONCE
Refills: 0 | Status: DISCONTINUED | OUTPATIENT
Start: 2022-08-02 | End: 2022-08-02

## 2022-08-02 RX ADMIN — Medication 975 MILLIGRAM(S): at 15:20

## 2022-08-02 RX ADMIN — Medication 100 MILLIGRAM(S): at 17:50

## 2022-08-02 NOTE — ASU DISCHARGE PLAN (ADULT/PEDIATRIC) - CARE PROVIDER_API CALL
Fran Alanis)  Urology  [address], [address]  [place] [address]  Phone: [phone]  Fax: [phone]  Established Patient  Follow Up Time: 1 week

## 2022-08-02 NOTE — BRIEF OPERATIVE NOTE - NSICDXBRIEFOPLAUNCH_GEN_ALL_CORE
The patient is a 44y Female complaining of <--- Click to Launch ICDx for PreOp, PostOp and Procedure

## 2022-08-02 NOTE — BRIEF OPERATIVE NOTE - NSICDXBRIEFPROCEDURE_GEN_ALL_CORE_FT
PROCEDURES:  Lithotripsy, with ureteral stent insertion 02-Aug-2022 20:06:03  Fran Alanis  Retrograde urography 02-Aug-2022 20:06:17  Fran Alanis

## 2022-08-03 PROCEDURE — C1758: CPT

## 2022-08-03 PROCEDURE — C1769: CPT

## 2022-08-03 PROCEDURE — 76000 FLUOROSCOPY <1 HR PHYS/QHP: CPT

## 2022-08-03 PROCEDURE — 82365 CALCULUS SPECTROSCOPY: CPT

## 2022-08-03 PROCEDURE — C2617: CPT

## 2022-08-03 PROCEDURE — 52356 CYSTO/URETERO W/LITHOTRIPSY: CPT | Mod: LT

## 2022-08-03 PROCEDURE — C1889: CPT

## 2022-08-03 PROCEDURE — 88300 SURGICAL PATH GROSS: CPT

## 2022-08-08 ENCOUNTER — APPOINTMENT (OUTPATIENT)
Dept: RADIOLOGY | Facility: CLINIC | Age: 87
End: 2022-08-08

## 2022-08-08 ENCOUNTER — OUTPATIENT (OUTPATIENT)
Dept: OUTPATIENT SERVICES | Facility: HOSPITAL | Age: 87
LOS: 1 days | End: 2022-08-08
Payer: MEDICARE

## 2022-08-08 ENCOUNTER — APPOINTMENT (OUTPATIENT)
Dept: UROLOGY | Facility: CLINIC | Age: 87
End: 2022-08-08

## 2022-08-08 DIAGNOSIS — N20.0 CALCULUS OF KIDNEY: ICD-10-CM

## 2022-08-08 DIAGNOSIS — Z90.49 ACQUIRED ABSENCE OF OTHER SPECIFIED PARTS OF DIGESTIVE TRACT: Chronic | ICD-10-CM

## 2022-08-08 DIAGNOSIS — Z96.0 PRESENCE OF UROGENITAL IMPLANTS: Chronic | ICD-10-CM

## 2022-08-08 DIAGNOSIS — Z98.890 OTHER SPECIFIED POSTPROCEDURAL STATES: Chronic | ICD-10-CM

## 2022-08-08 PROCEDURE — 74018 RADEX ABDOMEN 1 VIEW: CPT | Mod: 26

## 2022-08-08 PROCEDURE — 99213 OFFICE O/P EST LOW 20 MIN: CPT

## 2022-08-08 PROCEDURE — 74018 RADEX ABDOMEN 1 VIEW: CPT

## 2022-08-08 NOTE — LETTER BODY
[Dear  ___] : Dear  [unfilled], [Courtesy Letter:] : I had the pleasure of seeing your patient, [unfilled], in my office today. [Please see my note below.] : Please see my note below. [Sincerely,] : Sincerely, [FreeTextEntry3] : Ed\par \par Fran Alanis MD\par [facility]\par  of Urology\par [employer] at [facility]\par

## 2022-08-08 NOTE — HISTORY OF PRESENT ILLNESS
[FreeTextEntry1] : Patient presents in follow up after ureteroscopy. has a stent. no urgency.  has lower abdominal pain worse on the left side.  no nausea or fevers.  had stone fragmented.

## 2022-08-08 NOTE — ASSESSMENT
[FreeTextEntry1] : Impression:\par \par flank pain from stent\par kidney stones\par \par Plan:\par \par did not get KUB ordered. \par follow up in two days for stent removal and get KUB tomorrow.

## 2022-08-09 LAB — NIDUS STONE QN: SIGNIFICANT CHANGE UP

## 2022-08-10 ENCOUNTER — APPOINTMENT (OUTPATIENT)
Dept: UROLOGY | Facility: CLINIC | Age: 87
End: 2022-08-10

## 2022-08-10 VITALS — DIASTOLIC BLOOD PRESSURE: 83 MMHG | SYSTOLIC BLOOD PRESSURE: 161 MMHG | OXYGEN SATURATION: 93 % | HEART RATE: 91 BPM

## 2022-08-10 DIAGNOSIS — N20.1 CALCULUS OF URETER: ICD-10-CM

## 2022-08-10 LAB
BILIRUB UR QL STRIP: NORMAL
CLARITY UR: CLEAR
COLLECTION METHOD: NORMAL
GLUCOSE UR-MCNC: NORMAL
HCG UR QL: 1 EU/DL
HGB UR QL STRIP.AUTO: ABNORMAL
KETONES UR-MCNC: NORMAL
LEUKOCYTE ESTERASE UR QL STRIP: ABNORMAL
NITRITE UR QL STRIP: POSITIVE
PH UR STRIP: 6.5
PROT UR STRIP-MCNC: NORMAL
SP GR UR STRIP: 1.02

## 2022-08-10 PROCEDURE — 81003 URINALYSIS AUTO W/O SCOPE: CPT | Mod: QW

## 2022-08-10 PROCEDURE — 52310 CYSTOSCOPY AND TREATMENT: CPT

## 2022-08-10 RX ORDER — GENTAMICIN SULFATE 40 MG/ML
40 INJECTION, SOLUTION INTRAMUSCULAR; INTRAVENOUS
Qty: 1 | Refills: 0 | Status: COMPLETED | OUTPATIENT
Start: 2022-08-10

## 2022-08-10 RX ADMIN — GENTAMICIN SULFATE 0 MG/ML: 40 INJECTION, SOLUTION INTRAMUSCULAR; INTRAVENOUS at 00:00

## 2022-08-12 LAB — SURGICAL PATHOLOGY STUDY: SIGNIFICANT CHANGE UP

## 2022-09-12 ENCOUNTER — OUTPATIENT (OUTPATIENT)
Dept: OUTPATIENT SERVICES | Facility: HOSPITAL | Age: 87
LOS: 1 days | End: 2022-09-12

## 2022-09-12 DIAGNOSIS — Z96.0 PRESENCE OF UROGENITAL IMPLANTS: Chronic | ICD-10-CM

## 2022-09-12 DIAGNOSIS — Z98.890 OTHER SPECIFIED POSTPROCEDURAL STATES: Chronic | ICD-10-CM

## 2022-09-12 DIAGNOSIS — N20.1 CALCULUS OF URETER: ICD-10-CM

## 2022-09-12 DIAGNOSIS — Z90.49 ACQUIRED ABSENCE OF OTHER SPECIFIED PARTS OF DIGESTIVE TRACT: Chronic | ICD-10-CM

## 2022-09-12 PROCEDURE — 74018 RADEX ABDOMEN 1 VIEW: CPT | Mod: 26

## 2022-09-13 ENCOUNTER — APPOINTMENT (OUTPATIENT)
Dept: UROLOGY | Facility: CLINIC | Age: 87
End: 2022-09-13

## 2022-09-13 VITALS — SYSTOLIC BLOOD PRESSURE: 145 MMHG | DIASTOLIC BLOOD PRESSURE: 72 MMHG | OXYGEN SATURATION: 94 % | HEART RATE: 92 BPM

## 2022-09-13 DIAGNOSIS — N20.0 CALCULUS OF KIDNEY: ICD-10-CM

## 2022-09-13 PROCEDURE — 99213 OFFICE O/P EST LOW 20 MIN: CPT

## 2022-09-13 RX ORDER — GENTAMICIN SULFATE 40 MG/ML
40 INJECTION, SOLUTION INTRAMUSCULAR; INTRAVENOUS
Qty: 2 | Refills: 0 | Status: DISCONTINUED | COMMUNITY
Start: 2022-08-10 | End: 2022-09-13

## 2022-09-13 RX ORDER — CEPHALEXIN 250 MG/1
250 CAPSULE ORAL
Qty: 20 | Refills: 0 | Status: DISCONTINUED | COMMUNITY
Start: 2022-07-18 | End: 2022-09-13

## 2022-09-13 RX ORDER — CEPHALEXIN 250 MG/1
250 CAPSULE ORAL
Qty: 6 | Refills: 1 | Status: DISCONTINUED | COMMUNITY
Start: 2022-08-10 | End: 2022-09-13

## 2022-09-13 NOTE — HISTORY OF PRESENT ILLNESS
[FreeTextEntry1] : Feels better after stents out.  Has been having intermittent back pain. no fevers or chills.  Tylenol allows for temporary relief.

## 2022-09-13 NOTE — ASSESSMENT
[FreeTextEntry1] : kidney stone\par back pain\par \par Plan:\par \par CT stone\par follow up in 1 week.  Mildly to Moderately Impaired: difficulty hearing in some environments or speaker may need to increase volume or speak distinctly

## 2022-09-13 NOTE — LETTER BODY
[Dear  ___] : Dear  [unfilled], [Courtesy Letter:] : I had the pleasure of seeing your patient, [unfilled], in my office today. [Please see my note below.] : Please see my note below. [Sincerely,] : Sincerely, [FreeTextEntry3] : Ed\par \par Fran Alanis MD\par [facility]\par  of Urology\par [employer] at [facility]\par

## 2022-09-22 ENCOUNTER — OUTPATIENT (OUTPATIENT)
Dept: OUTPATIENT SERVICES | Facility: HOSPITAL | Age: 87
LOS: 1 days | End: 2022-09-22
Payer: MEDICARE

## 2022-09-22 ENCOUNTER — APPOINTMENT (OUTPATIENT)
Dept: CT IMAGING | Facility: CLINIC | Age: 87
End: 2022-09-22

## 2022-09-22 DIAGNOSIS — Z90.49 ACQUIRED ABSENCE OF OTHER SPECIFIED PARTS OF DIGESTIVE TRACT: Chronic | ICD-10-CM

## 2022-09-22 DIAGNOSIS — Z98.890 OTHER SPECIFIED POSTPROCEDURAL STATES: Chronic | ICD-10-CM

## 2022-09-22 DIAGNOSIS — Z96.0 PRESENCE OF UROGENITAL IMPLANTS: Chronic | ICD-10-CM

## 2022-09-22 DIAGNOSIS — N20.0 CALCULUS OF KIDNEY: ICD-10-CM

## 2022-09-22 PROCEDURE — 74176 CT ABD & PELVIS W/O CONTRAST: CPT | Mod: 26

## 2022-09-22 PROCEDURE — 74176 CT ABD & PELVIS W/O CONTRAST: CPT

## 2022-09-23 NOTE — PROGRESS NOTE ADULT - GASTROINTESTINAL
pleasant, well nourished, well developed, in no acute distress , normal communication ability soft/nontender details…

## 2022-09-29 ENCOUNTER — APPOINTMENT (OUTPATIENT)
Dept: UROLOGY | Facility: CLINIC | Age: 87
End: 2022-09-29

## 2022-09-29 VITALS — SYSTOLIC BLOOD PRESSURE: 185 MMHG | DIASTOLIC BLOOD PRESSURE: 68 MMHG

## 2022-09-29 DIAGNOSIS — Z87.891 PERSONAL HISTORY OF NICOTINE DEPENDENCE: ICD-10-CM

## 2022-09-29 PROCEDURE — 99213 OFFICE O/P EST LOW 20 MIN: CPT

## 2022-09-29 RX ORDER — AMLODIPINE BESYLATE 5 MG/1
5 TABLET ORAL
Qty: 30 | Refills: 0 | Status: DISCONTINUED | COMMUNITY
Start: 2022-07-18 | End: 2022-09-29

## 2022-09-29 RX ORDER — TAMSULOSIN HYDROCHLORIDE 0.4 MG/1
0.4 CAPSULE ORAL
Qty: 30 | Refills: 0 | Status: DISCONTINUED | COMMUNITY
Start: 2022-07-18 | End: 2022-09-29

## 2022-09-29 NOTE — LETTER BODY
[Dear  ___] : Dear  [unfilled], [Courtesy Letter:] : I had the pleasure of seeing your patient, [unfilled], in my office today. [Please see my note below.] : Please see my note below. [Sincerely,] : Sincerely, [FreeTextEntry3] : Ed\par \par Fran Alanis MD\par [facility]\par  of Urology\par [employer] at [facility]\par

## 2022-09-29 NOTE — ASSESSMENT
[FreeTextEntry1] : CT reviewed.\par \par \par Impression:\par \par right sided back pain, not of  origin. \par left renal calculus\par \par Plan:\par \par follow up with us PRN

## 2022-09-29 NOTE — HISTORY OF PRESENT ILLNESS
[FreeTextEntry1] : has intermittent pain.  no obvious cause to initiate pain. no hematuria. no dysuria.  no fevers or chills.

## 2023-02-23 NOTE — PROGRESS NOTE ADULT - PROBLEM SELECTOR PLAN 7
Return to ER if you feel short of breath, tongue swelling, or feel as though your throat is closing up.   
anticipate d/c home withy walker and commode.
anticipate d/c home withy walker and commode.
